# Patient Record
Sex: FEMALE | Race: WHITE | NOT HISPANIC OR LATINO
[De-identification: names, ages, dates, MRNs, and addresses within clinical notes are randomized per-mention and may not be internally consistent; named-entity substitution may affect disease eponyms.]

---

## 2017-01-10 ENCOUNTER — RESULT REVIEW (OUTPATIENT)
Age: 77
End: 2017-01-10

## 2017-01-10 NOTE — ASU PATIENT PROFILE, ADULT - PSH
H/O cystoscopy  h/o TURBT x 2--2015,   H/O hemicolectomy  h/o right hemicolectomy, right salpingo-ooporectomy, partial cystectomy-2014  History of   x 3  S/P CABG (coronary artery bypass graft)

## 2017-01-10 NOTE — ASU PATIENT PROFILE, ADULT - PMH
Bladder cancer  s/o chemoRX-20`13  Colon cancer  2013, s/p chemoRX  COPD (chronic obstructive pulmonary disease)  mild  Dyslipidemia    Herniated lumbar disc without myelopathy    Hypertension  x 25  years  PVD (peripheral vascular disease) with claudication    Smoking history    Vaginal lesion

## 2017-01-11 ENCOUNTER — OUTPATIENT (OUTPATIENT)
Dept: OUTPATIENT SERVICES | Facility: HOSPITAL | Age: 77
LOS: 1 days | Discharge: HOME | End: 2017-01-11
Payer: MEDICARE

## 2017-01-11 ENCOUNTER — APPOINTMENT (OUTPATIENT)
Dept: GYNECOLOGIC ONCOLOGY | Facility: HOSPITAL | Age: 77
End: 2017-01-11

## 2017-01-11 VITALS
SYSTOLIC BLOOD PRESSURE: 130 MMHG | OXYGEN SATURATION: 100 % | DIASTOLIC BLOOD PRESSURE: 70 MMHG | TEMPERATURE: 99 F | RESPIRATION RATE: 17 BRPM | HEART RATE: 56 BPM

## 2017-01-11 VITALS
HEART RATE: 54 BPM | OXYGEN SATURATION: 98 % | RESPIRATION RATE: 16 BRPM | HEIGHT: 57.25 IN | TEMPERATURE: 98 F | WEIGHT: 136.03 LBS | DIASTOLIC BLOOD PRESSURE: 49 MMHG | SYSTOLIC BLOOD PRESSURE: 169 MMHG

## 2017-01-11 DIAGNOSIS — Z95.1 PRESENCE OF AORTOCORONARY BYPASS GRAFT: Chronic | ICD-10-CM

## 2017-01-11 DIAGNOSIS — N89.3 DYSPLASIA OF VAGINA, UNSPECIFIED: ICD-10-CM

## 2017-01-11 LAB
ALBUMIN SERPL ELPH-MCNC: 3.9 G/DL — SIGNIFICANT CHANGE UP (ref 3.3–5)
ALP SERPL-CCNC: 95 U/L — SIGNIFICANT CHANGE UP (ref 40–120)
ALT FLD-CCNC: 14 U/L — SIGNIFICANT CHANGE UP (ref 4–33)
AST SERPL-CCNC: 16 U/L — SIGNIFICANT CHANGE UP (ref 4–32)
BILIRUB DIRECT SERPL-MCNC: 0.1 MG/DL — SIGNIFICANT CHANGE UP (ref 0.1–0.2)
BILIRUB SERPL-MCNC: 0.4 MG/DL — SIGNIFICANT CHANGE UP (ref 0.2–1.2)
HCT VFR BLD CALC: 33.8 % — LOW (ref 34.5–45)
HGB BLD-MCNC: 10.9 G/DL — LOW (ref 11.5–15.5)
HIV1 AG SER QL: SIGNIFICANT CHANGE UP
HIV1+2 AB SPEC QL: SIGNIFICANT CHANGE UP
MCHC RBC-ENTMCNC: 28.9 PG — SIGNIFICANT CHANGE UP (ref 27–34)
MCHC RBC-ENTMCNC: 32.2 % — SIGNIFICANT CHANGE UP (ref 32–36)
MCV RBC AUTO: 89.7 FL — SIGNIFICANT CHANGE UP (ref 80–100)
PLATELET # BLD AUTO: 207 K/UL — SIGNIFICANT CHANGE UP (ref 150–400)
PMV BLD: 10.8 FL — SIGNIFICANT CHANGE UP (ref 7–13)
PROT SERPL-MCNC: 6.1 G/DL — SIGNIFICANT CHANGE UP (ref 6–8.3)
RBC # BLD: 3.77 M/UL — LOW (ref 3.8–5.2)
RBC # FLD: 13.4 % — SIGNIFICANT CHANGE UP (ref 10.3–14.5)
WBC # BLD: 7.73 K/UL — SIGNIFICANT CHANGE UP (ref 3.8–10.5)
WBC # FLD AUTO: 7.73 K/UL — SIGNIFICANT CHANGE UP (ref 3.8–10.5)

## 2017-01-11 PROCEDURE — 88305 TISSUE EXAM BY PATHOLOGIST: CPT | Mod: 26

## 2017-01-11 PROCEDURE — 56620 VULVECTOMY SIMPLE PARTIAL: CPT

## 2017-01-11 RX ORDER — SODIUM CHLORIDE 9 MG/ML
1000 INJECTION, SOLUTION INTRAVENOUS
Qty: 0 | Refills: 0 | Status: DISCONTINUED | OUTPATIENT
Start: 2017-01-11 | End: 2017-01-11

## 2017-01-11 RX ORDER — SODIUM CHLORIDE 9 MG/ML
1000 INJECTION, SOLUTION INTRAVENOUS
Qty: 0 | Refills: 0 | Status: DISCONTINUED | OUTPATIENT
Start: 2017-01-11 | End: 2017-01-12

## 2017-01-11 NOTE — BRIEF OPERATIVE NOTE - OPERATION/FINDINGS
1x1 cm vaginal lesion at 5 o'clock. Friable polypoid tissue, mobile. Ellipsis incision created excising tissue, excellent hemostasis following case.

## 2017-01-11 NOTE — ASU DISCHARGE PLAN (ADULT/PEDIATRIC). - NOTIFY
Unable to Urinate/GYN Fever>100.4/Pain not relieved by Medications/Persistent Nausea and Vomiting/Swelling that continues/Inability to Tolerate Liquids or Foods/Bleeding that does not stop/Excessive Diarrhea/Increased Irritability or Sluggishness

## 2017-01-11 NOTE — ASU DISCHARGE PLAN (ADULT/PEDIATRIC). - MEDICATION SUMMARY - MEDICATIONS TO TAKE
I will START or STAY ON the medications listed below when I get home from the hospital:    Tylenol 325 mg oral capsule  -- 3 tab(s) by mouth every 6 hours  -- Indication: For post op pain    Motrin  mg oral tablet  -- 3 tab(s) by mouth every 6 hours  -- Indication: For post op pain    aspirin 325 mg oral delayed release tablet  -- pt takes occasionally  -- Indication: For home med    losartan 50 mg oral tablet  -- 1 tab(s) by mouth  Mon, Weds Fri  -- Indication: For home med    doxazosin 8 mg oral tablet  -- 2 tab(s) by mouth once a day in pm  -- Indication: For home med    atorvastatin 20 mg oral tablet  -- 1 tab(s) by mouth Mon Weds Fri  -- Indication: For home med    metoprolol tartrate 50 mg oral tablet  -- 1 tab(s) by mouth 2 times a day  -- Indication: For home med    amLODIPine 10 mg oral tablet  -- 1 tab(s) by mouth once a day in am  -- Indication: For home med    furosemide 40 mg oral tablet  -- 1 tab(s) by mouth Mon [place], Weds;1/10/17 ;s/w pt via phone ;  pt states  rx dc d 2 weeks ago ; pt states pcp aware  -- Indication: For home med

## 2017-01-12 LAB
HBV CORE AB SER-ACNC: REACTIVE — SIGNIFICANT CHANGE UP
HBV SURFACE AB SER-ACNC: REACTIVE — SIGNIFICANT CHANGE UP
HBV SURFACE AG SER-ACNC: NONREACTIVE — SIGNIFICANT CHANGE UP
HCV AB S/CO SERPL IA: 0.11 S/CO — SIGNIFICANT CHANGE UP
HCV AB SERPL-IMP: SIGNIFICANT CHANGE UP

## 2017-01-22 LAB — SURGICAL PATHOLOGY STUDY: SIGNIFICANT CHANGE UP

## 2017-01-30 ENCOUNTER — APPOINTMENT (OUTPATIENT)
Dept: GYNECOLOGIC ONCOLOGY | Facility: CLINIC | Age: 77
End: 2017-01-30

## 2017-07-28 ENCOUNTER — APPOINTMENT (OUTPATIENT)
Dept: UROLOGY | Facility: CLINIC | Age: 77
End: 2017-07-28
Payer: MEDICARE

## 2017-07-28 ENCOUNTER — OUTPATIENT (OUTPATIENT)
Dept: OUTPATIENT SERVICES | Facility: HOSPITAL | Age: 77
LOS: 1 days | End: 2017-07-28
Payer: MEDICARE

## 2017-07-28 DIAGNOSIS — Z95.1 PRESENCE OF AORTOCORONARY BYPASS GRAFT: Chronic | ICD-10-CM

## 2017-07-28 DIAGNOSIS — R35.0 FREQUENCY OF MICTURITION: ICD-10-CM

## 2017-07-28 PROCEDURE — 52000 CYSTOURETHROSCOPY: CPT

## 2017-08-01 ENCOUNTER — APPOINTMENT (OUTPATIENT)
Dept: GYNECOLOGIC ONCOLOGY | Facility: CLINIC | Age: 77
End: 2017-08-01

## 2017-08-01 ENCOUNTER — APPOINTMENT (OUTPATIENT)
Dept: GYNECOLOGIC ONCOLOGY | Facility: CLINIC | Age: 77
End: 2017-08-01
Payer: MEDICARE

## 2017-08-01 VITALS
WEIGHT: 135 LBS | SYSTOLIC BLOOD PRESSURE: 122 MMHG | DIASTOLIC BLOOD PRESSURE: 78 MMHG | HEIGHT: 58 IN | BODY MASS INDEX: 28.34 KG/M2

## 2017-08-01 DIAGNOSIS — Z87.411 PERSONAL HISTORY OF VAGINAL DYSPLASIA: ICD-10-CM

## 2017-08-01 LAB — CORE LAB FLUID CYTOLOGY: NORMAL

## 2017-08-01 PROCEDURE — 99213 OFFICE O/P EST LOW 20 MIN: CPT

## 2017-08-07 DIAGNOSIS — C67.9 MALIGNANT NEOPLASM OF BLADDER, UNSPECIFIED: ICD-10-CM

## 2018-01-29 ENCOUNTER — OUTPATIENT (OUTPATIENT)
Dept: OUTPATIENT SERVICES | Facility: HOSPITAL | Age: 78
LOS: 1 days | Discharge: HOME | End: 2018-01-29

## 2018-01-29 VITALS
OXYGEN SATURATION: 95 % | HEIGHT: 58.5 IN | HEART RATE: 62 BPM | WEIGHT: 139.77 LBS | DIASTOLIC BLOOD PRESSURE: 54 MMHG | SYSTOLIC BLOOD PRESSURE: 160 MMHG | RESPIRATION RATE: 16 BRPM | TEMPERATURE: 97 F

## 2018-01-29 DIAGNOSIS — M47.816 SPONDYLOSIS WITHOUT MYELOPATHY OR RADICULOPATHY, LUMBAR REGION: ICD-10-CM

## 2018-01-29 DIAGNOSIS — E78.5 HYPERLIPIDEMIA, UNSPECIFIED: ICD-10-CM

## 2018-01-29 DIAGNOSIS — I10 ESSENTIAL (PRIMARY) HYPERTENSION: ICD-10-CM

## 2018-01-29 DIAGNOSIS — Z01.818 ENCOUNTER FOR OTHER PREPROCEDURAL EXAMINATION: ICD-10-CM

## 2018-01-29 DIAGNOSIS — Z95.1 PRESENCE OF AORTOCORONARY BYPASS GRAFT: Chronic | ICD-10-CM

## 2018-01-29 DIAGNOSIS — M54.9 DORSALGIA, UNSPECIFIED: ICD-10-CM

## 2018-01-29 DIAGNOSIS — Z98.890 OTHER SPECIFIED POSTPROCEDURAL STATES: Chronic | ICD-10-CM

## 2018-01-29 LAB
ALBUMIN SERPL ELPH-MCNC: 4 G/DL — SIGNIFICANT CHANGE UP (ref 3.3–5)
ALP SERPL-CCNC: 121 U/L — HIGH (ref 40–120)
ALT FLD-CCNC: 31 U/L — SIGNIFICANT CHANGE UP (ref 12–78)
ANION GAP SERPL CALC-SCNC: 6 MMOL/L — SIGNIFICANT CHANGE UP (ref 5–17)
APTT BLD: 31.2 SEC — SIGNIFICANT CHANGE UP (ref 27.5–37.4)
AST SERPL-CCNC: 27 U/L — SIGNIFICANT CHANGE UP (ref 15–37)
BASOPHILS # BLD AUTO: 0.03 K/UL — SIGNIFICANT CHANGE UP (ref 0–0.2)
BASOPHILS NFR BLD AUTO: 0.5 % — SIGNIFICANT CHANGE UP (ref 0–2)
BILIRUB SERPL-MCNC: 0.7 MG/DL — SIGNIFICANT CHANGE UP (ref 0.2–1.2)
BUN SERPL-MCNC: 27 MG/DL — HIGH (ref 7–23)
CALCIUM SERPL-MCNC: 9.3 MG/DL — SIGNIFICANT CHANGE UP (ref 8.5–10.1)
CHLORIDE SERPL-SCNC: 106 MMOL/L — SIGNIFICANT CHANGE UP (ref 96–108)
CO2 SERPL-SCNC: 28 MMOL/L — SIGNIFICANT CHANGE UP (ref 22–31)
CREAT SERPL-MCNC: 1.35 MG/DL — HIGH (ref 0.5–1.3)
EOSINOPHIL # BLD AUTO: 0.14 K/UL — SIGNIFICANT CHANGE UP (ref 0–0.5)
EOSINOPHIL NFR BLD AUTO: 2.4 % — SIGNIFICANT CHANGE UP (ref 0–6)
GLUCOSE SERPL-MCNC: 100 MG/DL — HIGH (ref 70–99)
HBA1C BLD-MCNC: 5.4 % — SIGNIFICANT CHANGE UP (ref 4–5.6)
HCT VFR BLD CALC: 42.5 % — SIGNIFICANT CHANGE UP (ref 34.5–45)
HGB BLD-MCNC: 13.8 G/DL — SIGNIFICANT CHANGE UP (ref 11.5–15.5)
IMM GRANULOCYTES NFR BLD AUTO: 0.3 % — SIGNIFICANT CHANGE UP (ref 0–1.5)
INR BLD: 0.98 RATIO — SIGNIFICANT CHANGE UP (ref 0.88–1.16)
LYMPHOCYTES # BLD AUTO: 1.26 K/UL — SIGNIFICANT CHANGE UP (ref 1–3.3)
LYMPHOCYTES # BLD AUTO: 21.7 % — SIGNIFICANT CHANGE UP (ref 13–44)
MCHC RBC-ENTMCNC: 29 PG — SIGNIFICANT CHANGE UP (ref 27–34)
MCHC RBC-ENTMCNC: 32.5 GM/DL — SIGNIFICANT CHANGE UP (ref 32–36)
MCV RBC AUTO: 89.3 FL — SIGNIFICANT CHANGE UP (ref 80–100)
MONOCYTES # BLD AUTO: 0.67 K/UL — SIGNIFICANT CHANGE UP (ref 0–0.9)
MONOCYTES NFR BLD AUTO: 11.5 % — SIGNIFICANT CHANGE UP (ref 2–14)
NEUTROPHILS # BLD AUTO: 3.69 K/UL — SIGNIFICANT CHANGE UP (ref 1.8–7.4)
NEUTROPHILS NFR BLD AUTO: 63.6 % — SIGNIFICANT CHANGE UP (ref 43–77)
PLATELET # BLD AUTO: 221 K/UL — SIGNIFICANT CHANGE UP (ref 150–400)
POTASSIUM SERPL-MCNC: 4.6 MMOL/L — SIGNIFICANT CHANGE UP (ref 3.5–5.3)
POTASSIUM SERPL-SCNC: 4.6 MMOL/L — SIGNIFICANT CHANGE UP (ref 3.5–5.3)
PROT SERPL-MCNC: 7.8 GM/DL — SIGNIFICANT CHANGE UP (ref 6–8.3)
PROTHROM AB SERPL-ACNC: 10.7 SEC — SIGNIFICANT CHANGE UP (ref 9.8–12.7)
RBC # BLD: 4.76 M/UL — SIGNIFICANT CHANGE UP (ref 3.8–5.2)
RBC # FLD: 13.2 % — SIGNIFICANT CHANGE UP (ref 10.3–14.5)
SODIUM SERPL-SCNC: 140 MMOL/L — SIGNIFICANT CHANGE UP (ref 135–145)
WBC # BLD: 5.81 K/UL — SIGNIFICANT CHANGE UP (ref 3.8–10.5)
WBC # FLD AUTO: 5.81 K/UL — SIGNIFICANT CHANGE UP (ref 3.8–10.5)

## 2018-01-29 RX ORDER — IBUPROFEN 200 MG
3 TABLET ORAL
Qty: 0 | Refills: 0 | COMMUNITY

## 2018-01-29 NOTE — H&P PST ADULT - PMH
Bladder cancer  s/o [id number]-20`13  Colon cancer  2013, s/p chemoRX  COPD (chronic obstructive pulmonary disease)  mild  Dyslipidemia    Herniated lumbar disc without myelopathy    Hypertension  x 25  years  PVD (peripheral vascular disease) with claudication    S/P CABG x 2  and AVR  S/P carotid endarterectomy    Smoking history    Vaginal lesion Afib    Bladder cancer  s/o [id number]`13  Colon cancer  2013, s/p chemoRX  COPD (chronic obstructive pulmonary disease)  mild  Dyslipidemia    Herniated lumbar disc without myelopathy    Hypertension  x 25  years  PVD (peripheral vascular disease) with claudication    S/P CABG x 2  and AVR  S/P carotid endarterectomy    Smoking history    Vaginal lesion

## 2018-01-29 NOTE — H&P PST ADULT - ATTENDING COMMENTS
lumbar spinal stenosis with facet arthritis from L3-5 with failure of conservative treatment indicated for laminectomy and fusion from L3-5 - disucssion of r/b/e - patient understands and is well informed

## 2018-01-29 NOTE — H&P PST ADULT - NSANTHOSAYNRD_GEN_A_CORE
denies/No. NORA screening performed.  STOP BANG Legend: 0-2 = LOW Risk; 3-4 = INTERMEDIATE Risk; 5-8 = HIGH Risk

## 2018-01-29 NOTE — H&P PST ADULT - HISTORY OF PRESENT ILLNESS
77 yo female c/o back pains for awhile, not responding to conservative management- scheduled for spinal fusion

## 2018-01-29 NOTE — H&P PST ADULT - PSH
H/O cystoscopy  h/o TURBT x 2--2015,   H/O hemicolectomy  h/o right hemicolectomy, right salpingo-ooporectomy, partial cystectomy-2014  History of   x 3  S/P CABG (coronary artery bypass graft)   and AVR  S/P carotid endarterectomy

## 2018-02-06 ENCOUNTER — TRANSCRIPTION ENCOUNTER (OUTPATIENT)
Age: 78
End: 2018-02-06

## 2018-02-06 ENCOUNTER — INPATIENT (INPATIENT)
Facility: HOSPITAL | Age: 78
LOS: 5 days | Discharge: HOME | End: 2018-02-12
Attending: ORTHOPAEDIC SURGERY | Admitting: ORTHOPAEDIC SURGERY
Payer: MEDICARE

## 2018-02-06 VITALS
HEART RATE: 57 BPM | RESPIRATION RATE: 17 BRPM | DIASTOLIC BLOOD PRESSURE: 52 MMHG | OXYGEN SATURATION: 96 % | HEIGHT: 58.5 IN | WEIGHT: 138.01 LBS | TEMPERATURE: 97 F | SYSTOLIC BLOOD PRESSURE: 174 MMHG

## 2018-02-06 DIAGNOSIS — Z95.1 PRESENCE OF AORTOCORONARY BYPASS GRAFT: Chronic | ICD-10-CM

## 2018-02-06 DIAGNOSIS — Z98.890 OTHER SPECIFIED POSTPROCEDURAL STATES: Chronic | ICD-10-CM

## 2018-02-06 LAB
ANION GAP SERPL CALC-SCNC: 10 MMOL/L — SIGNIFICANT CHANGE UP (ref 5–17)
BASOPHILS # BLD AUTO: 0 K/UL — SIGNIFICANT CHANGE UP (ref 0–0.2)
BASOPHILS NFR BLD AUTO: 0 % — SIGNIFICANT CHANGE UP (ref 0–2)
BLD GP AB SCN SERPL QL: SIGNIFICANT CHANGE UP
BUN SERPL-MCNC: 28 MG/DL — HIGH (ref 7–23)
CALCIUM SERPL-MCNC: 7.6 MG/DL — LOW (ref 8.5–10.1)
CHLORIDE SERPL-SCNC: 113 MMOL/L — HIGH (ref 96–108)
CO2 SERPL-SCNC: 20 MMOL/L — LOW (ref 22–31)
CREAT SERPL-MCNC: 1.35 MG/DL — HIGH (ref 0.5–1.3)
EOSINOPHIL # BLD AUTO: 0.2 K/UL — SIGNIFICANT CHANGE UP (ref 0–0.5)
EOSINOPHIL NFR BLD AUTO: 2 % — SIGNIFICANT CHANGE UP (ref 0–6)
GLUCOSE SERPL-MCNC: 220 MG/DL — HIGH (ref 70–99)
HCT VFR BLD CALC: 30.7 % — LOW (ref 34.5–45)
HGB BLD-MCNC: 10 G/DL — LOW (ref 11.5–15.5)
LYMPHOCYTES # BLD AUTO: 0.51 K/UL — LOW (ref 1–3.3)
LYMPHOCYTES # BLD AUTO: 5 % — LOW (ref 13–44)
MANUAL SMEAR VERIFICATION: SIGNIFICANT CHANGE UP
MCHC RBC-ENTMCNC: 29.9 PG — SIGNIFICANT CHANGE UP (ref 27–34)
MCHC RBC-ENTMCNC: 32.6 GM/DL — SIGNIFICANT CHANGE UP (ref 32–36)
MCV RBC AUTO: 91.9 FL — SIGNIFICANT CHANGE UP (ref 80–100)
MONOCYTES # BLD AUTO: 0.2 K/UL — SIGNIFICANT CHANGE UP (ref 0–0.9)
MONOCYTES NFR BLD AUTO: 2 % — SIGNIFICANT CHANGE UP (ref 2–14)
NEUTROPHILS # BLD AUTO: 9.31 K/UL — HIGH (ref 1.8–7.4)
NEUTROPHILS NFR BLD AUTO: 79 % — HIGH (ref 43–77)
NEUTS BAND # BLD: 12 — SIGNIFICANT CHANGE UP
NRBC # BLD: 0 — SIGNIFICANT CHANGE UP
NRBC # BLD: SIGNIFICANT CHANGE UP /100 WBCS (ref 0–0)
PLAT MORPH BLD: NORMAL — SIGNIFICANT CHANGE UP
PLATELET # BLD AUTO: 163 K/UL — SIGNIFICANT CHANGE UP (ref 150–400)
POTASSIUM SERPL-MCNC: 3.7 MMOL/L — SIGNIFICANT CHANGE UP (ref 3.5–5.3)
POTASSIUM SERPL-SCNC: 3.7 MMOL/L — SIGNIFICANT CHANGE UP (ref 3.5–5.3)
RBC # BLD: 3.34 M/UL — LOW (ref 3.8–5.2)
RBC # FLD: 13.2 % — SIGNIFICANT CHANGE UP (ref 10.3–14.5)
RBC BLD AUTO: SIGNIFICANT CHANGE UP
SODIUM SERPL-SCNC: 143 MMOL/L — SIGNIFICANT CHANGE UP (ref 135–145)
WBC # BLD: 10.23 K/UL — SIGNIFICANT CHANGE UP (ref 3.8–10.5)
WBC # FLD AUTO: 10.23 K/UL — SIGNIFICANT CHANGE UP (ref 3.8–10.5)

## 2018-02-06 PROCEDURE — 99223 1ST HOSP IP/OBS HIGH 75: CPT

## 2018-02-06 RX ORDER — FENTANYL CITRATE 50 UG/ML
25 INJECTION INTRAVENOUS
Qty: 0 | Refills: 0 | Status: DISCONTINUED | OUTPATIENT
Start: 2018-02-06 | End: 2018-02-06

## 2018-02-06 RX ORDER — DOCUSATE SODIUM 100 MG
100 CAPSULE ORAL THREE TIMES A DAY
Qty: 0 | Refills: 0 | Status: DISCONTINUED | OUTPATIENT
Start: 2018-02-06 | End: 2018-02-12

## 2018-02-06 RX ORDER — SODIUM CHLORIDE 9 MG/ML
1000 INJECTION, SOLUTION INTRAVENOUS
Qty: 0 | Refills: 0 | Status: DISCONTINUED | OUTPATIENT
Start: 2018-02-06 | End: 2018-02-06

## 2018-02-06 RX ORDER — DIPHENHYDRAMINE HCL 50 MG
12.5 CAPSULE ORAL EVERY 4 HOURS
Qty: 0 | Refills: 0 | Status: DISCONTINUED | OUTPATIENT
Start: 2018-02-06 | End: 2018-02-11

## 2018-02-06 RX ORDER — MORPHINE SULFATE 50 MG/1
2 CAPSULE, EXTENDED RELEASE ORAL EVERY 4 HOURS
Qty: 0 | Refills: 0 | Status: DISCONTINUED | OUTPATIENT
Start: 2018-02-06 | End: 2018-02-06

## 2018-02-06 RX ORDER — METOCLOPRAMIDE HCL 10 MG
10 TABLET ORAL ONCE
Qty: 0 | Refills: 0 | Status: COMPLETED | OUTPATIENT
Start: 2018-02-06 | End: 2018-02-07

## 2018-02-06 RX ORDER — CEFAZOLIN SODIUM 1 G
2000 VIAL (EA) INJECTION EVERY 8 HOURS
Qty: 0 | Refills: 0 | Status: COMPLETED | OUTPATIENT
Start: 2018-02-06 | End: 2018-02-06

## 2018-02-06 RX ORDER — AMLODIPINE BESYLATE 2.5 MG/1
10 TABLET ORAL DAILY
Qty: 0 | Refills: 0 | Status: DISCONTINUED | OUTPATIENT
Start: 2018-02-06 | End: 2018-02-12

## 2018-02-06 RX ORDER — ACETAMINOPHEN 500 MG
3 TABLET ORAL
Qty: 0 | Refills: 0 | COMMUNITY

## 2018-02-06 RX ORDER — OXYCODONE AND ACETAMINOPHEN 5; 325 MG/1; MG/1
1 TABLET ORAL EVERY 4 HOURS
Qty: 0 | Refills: 0 | Status: DISCONTINUED | OUTPATIENT
Start: 2018-02-06 | End: 2018-02-12

## 2018-02-06 RX ORDER — METOPROLOL TARTRATE 50 MG
50 TABLET ORAL
Qty: 0 | Refills: 0 | Status: DISCONTINUED | OUTPATIENT
Start: 2018-02-06 | End: 2018-02-12

## 2018-02-06 RX ORDER — ATORVASTATIN CALCIUM 80 MG/1
20 TABLET, FILM COATED ORAL AT BEDTIME
Qty: 0 | Refills: 0 | Status: DISCONTINUED | OUTPATIENT
Start: 2018-02-06 | End: 2018-02-06

## 2018-02-06 RX ORDER — ATORVASTATIN CALCIUM 80 MG/1
20 TABLET, FILM COATED ORAL
Qty: 0 | Refills: 0 | Status: DISCONTINUED | OUTPATIENT
Start: 2018-02-06 | End: 2018-02-12

## 2018-02-06 RX ORDER — DOXAZOSIN MESYLATE 4 MG
8 TABLET ORAL AT BEDTIME
Qty: 0 | Refills: 0 | Status: DISCONTINUED | OUTPATIENT
Start: 2018-02-06 | End: 2018-02-12

## 2018-02-06 RX ORDER — SODIUM CHLORIDE 9 MG/ML
1000 INJECTION, SOLUTION INTRAVENOUS
Qty: 0 | Refills: 0 | Status: DISCONTINUED | OUTPATIENT
Start: 2018-02-06 | End: 2018-02-07

## 2018-02-06 RX ORDER — DOXAZOSIN MESYLATE 4 MG
16 TABLET ORAL AT BEDTIME
Qty: 0 | Refills: 0 | Status: DISCONTINUED | OUTPATIENT
Start: 2018-02-06 | End: 2018-02-06

## 2018-02-06 RX ORDER — FENTANYL CITRATE 50 UG/ML
50 INJECTION INTRAVENOUS
Qty: 0 | Refills: 0 | Status: DISCONTINUED | OUTPATIENT
Start: 2018-02-06 | End: 2018-02-06

## 2018-02-06 RX ORDER — OXYCODONE AND ACETAMINOPHEN 5; 325 MG/1; MG/1
2 TABLET ORAL EVERY 6 HOURS
Qty: 0 | Refills: 0 | Status: DISCONTINUED | OUTPATIENT
Start: 2018-02-06 | End: 2018-02-10

## 2018-02-06 RX ORDER — ACETAMINOPHEN 500 MG
1000 TABLET ORAL ONCE
Qty: 0 | Refills: 0 | Status: COMPLETED | OUTPATIENT
Start: 2018-02-06 | End: 2018-02-06

## 2018-02-06 RX ORDER — LOSARTAN POTASSIUM 100 MG/1
1 TABLET, FILM COATED ORAL
Qty: 0 | Refills: 0 | COMMUNITY

## 2018-02-06 RX ORDER — LOSARTAN POTASSIUM 100 MG/1
100 TABLET, FILM COATED ORAL DAILY
Qty: 0 | Refills: 0 | Status: DISCONTINUED | OUTPATIENT
Start: 2018-02-06 | End: 2018-02-12

## 2018-02-06 RX ADMIN — FENTANYL CITRATE 50 MICROGRAM(S): 50 INJECTION INTRAVENOUS at 14:18

## 2018-02-06 RX ADMIN — Medication 8 MILLIGRAM(S): at 23:08

## 2018-02-06 RX ADMIN — OXYCODONE AND ACETAMINOPHEN 2 TABLET(S): 5; 325 TABLET ORAL at 20:23

## 2018-02-06 RX ADMIN — OXYCODONE AND ACETAMINOPHEN 2 TABLET(S): 5; 325 TABLET ORAL at 19:43

## 2018-02-06 RX ADMIN — Medication 1000 MILLIGRAM(S): at 14:13

## 2018-02-06 RX ADMIN — FENTANYL CITRATE 50 MICROGRAM(S): 50 INJECTION INTRAVENOUS at 14:03

## 2018-02-06 RX ADMIN — Medication 400 MILLIGRAM(S): at 13:57

## 2018-02-06 RX ADMIN — SODIUM CHLORIDE 100 MILLILITER(S): 9 INJECTION, SOLUTION INTRAVENOUS at 13:58

## 2018-02-06 RX ADMIN — Medication 100 MILLIGRAM(S): at 16:19

## 2018-02-06 RX ADMIN — Medication 100 MILLIGRAM(S): at 23:25

## 2018-02-06 NOTE — ASU PATIENT PROFILE, ADULT - PMH
Afib    Bladder cancer  s/o chemoRX-20`13  Colon cancer  2013, s/p chemoRX  COPD (chronic obstructive pulmonary disease)  mild  Dyslipidemia    Herniated lumbar disc without myelopathy    Hypertension  x 25  years  PVD (peripheral vascular disease) with claudication    S/P CABG x 2  and AVR  S/P carotid endarterectomy    Smoking history    Vaginal lesion

## 2018-02-06 NOTE — CONSULT NOTE ADULT - SUBJECTIVE AND OBJECTIVE BOX
LABS:                  RADIOLOGY & ADDITIONAL TESTS:          PHYSICAL EXAMINATION:  Vital Signs Last 24 Hrs  T(C): 36 (06 Feb 2018 13:00), Max: 36.1 (06 Feb 2018 06:42)  T(F): 96.8 (06 Feb 2018 13:00), Max: 97 (06 Feb 2018 06:42)  HR: 62 (06 Feb 2018 13:30) (57 - 71)  BP: 108/59 (06 Feb 2018 13:30) (98/51 - 174/52)  BP(mean): --  RR: 20 (06 Feb 2018 13:30) (17 - 20)  SpO2: 100% (06 Feb 2018 13:30) (96% - 100%)  CAPILLARY BLOOD GLUCOSE          GENERAL: NAD, well-groomed, well-developed  HEAD:  atraumatic, normocephalic  EYES: sclera anicteric  ENMT: mucous membranes moist  NECK: supple, No JVD  CHEST/LUNG: clear to auscultation bilaterally; no rales, rhonchi, or wheezing b/l  HEART: normal S1, S2  ABDOMEN: BS+, soft, ND, NT   EXTREMITIES:  pulses palpable; no clubbing, cyanosis, or edema b/l LEs  NEURO: awake, alert, interactive; moves all extremities  SKIN: no rashes or lesions 78 year old female PMH of HTN, HLD, admitted for elective lumbar spinal laminectomy and spinal fusion. Denies HA, CP,SOB, abdominal pain, leg edema or syncope. Remaining ROS is WNL.     Allergies:     Social:             LABS:                  RADIOLOGY & ADDITIONAL TESTS:          PHYSICAL EXAMINATION:  Vital Signs Last 24 Hrs  T(C): 36 (06 Feb 2018 13:00), Max: 36.1 (06 Feb 2018 06:42)  T(F): 96.8 (06 Feb 2018 13:00), Max: 97 (06 Feb 2018 06:42)  HR: 62 (06 Feb 2018 13:30) (57 - 71)  BP: 108/59 (06 Feb 2018 13:30) (98/51 - 174/52)  BP(mean): --  RR: 20 (06 Feb 2018 13:30) (17 - 20)  SpO2: 100% (06 Feb 2018 13:30) (96% - 100%)  CAPILLARY BLOOD GLUCOSE          GENERAL: NAD, well-groomed, well-developed  HEAD:  atraumatic, normocephalic  EYES: sclera anicteric  ENMT: mucous membranes moist  NECK: supple, No JVD  CHEST/LUNG: clear to auscultation bilaterally; no rales, rhonchi, or wheezing b/l  HEART: normal S1, S2  ABDOMEN: BS+, soft, ND, NT   EXTREMITIES:  pulses palpable; no clubbing, cyanosis, or edema b/l LEs  NEURO: awake, alert, interactive; moves all extremities  SKIN: no rashes or lesions 78 year old female PMH of HTN, HLD, A.fib. PVD, admitted for elective lumbar spinal laminectomy and spinal fusion. Denies HA, CP,SOB, abdominal pain, leg edema or syncope. Remaining ROS is not fully assessable as seen in PACU post OR.                    LABS:                  RADIOLOGY & ADDITIONAL TESTS:                                PHYSICAL EXAMINATION:  Vital Signs Last 24 Hrs  T(C): 36 (06 Feb 2018 13:00), Max: 36.1 (06 Feb 2018 06:42)  T(F): 96.8 (06 Feb 2018 13:00), Max: 97 (06 Feb 2018 06:42)  HR: 62 (06 Feb 2018 13:30) (57 - 71)  BP: 108/59 (06 Feb 2018 13:30) (98/51 - 174/52)  BP(mean): --  RR: 20 (06 Feb 2018 13:30) (17 - 20)  SpO2: 100% (06 Feb 2018 13:30) (96% - 100%)  CAPILLARY BLOOD GLUCOSE          GENERAL: NAD, asleep in PACU.   HEAD:  atraumatic, normocephalic  EYES: sclera anicteric  ENMT: mucous membranes moist  NECK: supple, No JVD  CHEST/LUNG: clear to auscultation bilaterally; no rales, rhonchi, or wheezing b/l  HEART: normal S1, S2  ABDOMEN: BS+, soft, ND, NT   EXTREMITIES:  pulses palpable; no clubbing, cyanosis, or edema b/l LEs  NEURO: awake, alert, interactive; moves all extremities  SKIN: no rashes or lesions 78 year old female PMH of HTN, HLD, A.fib. PVD, admitted for elective lumbar spinal laminectomy and spinal fusion. Denies HA, CP,SOB, abdominal pain, leg edema or syncope. Remaining ROS is not fully assessable as seen in PACU post OR.        PSH: Cartoid endarterectomy, CABG x 2.            LABS:                  RADIOLOGY & ADDITIONAL TESTS:                                PHYSICAL EXAMINATION:  Vital Signs Last 24 Hrs  T(C): 36 (06 Feb 2018 13:00), Max: 36.1 (06 Feb 2018 06:42)  T(F): 96.8 (06 Feb 2018 13:00), Max: 97 (06 Feb 2018 06:42)  HR: 62 (06 Feb 2018 13:30) (57 - 71)  BP: 108/59 (06 Feb 2018 13:30) (98/51 - 174/52)  BP(mean): --  RR: 20 (06 Feb 2018 13:30) (17 - 20)  SpO2: 100% (06 Feb 2018 13:30) (96% - 100%)  CAPILLARY BLOOD GLUCOSE          GENERAL: NAD, asleep in PACU.   HEAD:  atraumatic, normocephalic  EYES: sclera anicteric  ENMT: mucous membranes moist  NECK: supple, No JVD  CHEST/LUNG: clear to auscultation bilaterally; no rales, rhonchi, or wheezing b/l  HEART: normal S1, S2  ABDOMEN: BS+, soft, ND, NT   EXTREMITIES:  pulses palpable; no clubbing, cyanosis, or edema b/l LEs  NEURO: awake, alert, interactive; moves all extremities  SKIN: no rashes or lesions 78 year old female PMH of HTN, HLD, A.fib. PVD, admitted for elective lumbar spinal laminectomy and spinal fusion. Denies HA, CP,SOB, abdominal pain, leg edema or syncope. Remaining ROS is not fully assessable as seen in PACU post OR.        PSH: Cartoid endarterectomy, CABG x 2.        LABS:                        11.7   11.98 )-----------( 143      ( 07 Feb 2018 06:38 )             34.1     02-07    140  |  110<H>  |  24<H>  ----------------------------<  104<H>  5.1   |  23  |  1.19    Ca    7.6<L>      07 Feb 2018 06:38        PhySICAL EXAMINATION:  Vital Signs Last 24 Hrs  T(C): 36 (06 Feb 2018 13:00), Max: 36.1 (06 Feb 2018 06:42)  T(F): 96.8 (06 Feb 2018 13:00), Max: 97 (06 Feb 2018 06:42)  HR: 62 (06 Feb 2018 13:30) (57 - 71)  BP: 108/59 (06 Feb 2018 13:30) (98/51 - 174/52)  BP(mean): --  RR: 20 (06 Feb 2018 13:30) (17 - 20)  SpO2: 100% (06 Feb 2018 13:30) (96% - 100%)  CAPILLARY BLOOD GLUCOSE          GENERAL: NAD, asleep in PACU.   HEAD:  atraumatic, normocephalic  EYES: sclera anicteric  ENMT: mucous membranes moist  NECK: supple, No JVD  CHEST/LUNG: clear to auscultation bilaterally; no rales, rhonchi, or wheezing b/l  HEART: normal S1, S2  ABDOMEN: BS+, soft, ND, NT   EXTREMITIES:  pulses palpable; no clubbing, cyanosis, or edema b/l LEs  NEURO: awake, alert, interactive; moves all extremities  SKIN: no rashes or lesions 78 year old female PMH of HTN, HLD, ASHD, admitted for elective lumbar spinal laminectomy and spinal fusion. Denies HA, CP,SOB, abdominal pain, leg edema or syncope. Remaining ROS is not fully assessable as seen in PACU post OR.  Had pre-op chemical stress test 2 months prior to operation and was reported to patient as normal.       PSH: Left cartoid endarterectomy, CABG.      NKDA    No smoking or daily ETOH use.         LABS:                        11.7   11.98 )-----------( 143      ( 07 Feb 2018 06:38 )             34.1     02-07    140  |  110<H>  |  24<H>  ----------------------------<  104<H>  5.1   |  23  |  1.19    Ca    7.6<L>      07 Feb 2018 06:38        PhySICAL EXAMINATION:  Vital Signs Last 24 Hrs  T(C): 36 (06 Feb 2018 13:00), Max: 36.1 (06 Feb 2018 06:42)  T(F): 96.8 (06 Feb 2018 13:00), Max: 97 (06 Feb 2018 06:42)  HR: 62 (06 Feb 2018 13:30) (57 - 71)  BP: 108/59 (06 Feb 2018 13:30) (98/51 - 174/52)  BP(mean): --  RR: 20 (06 Feb 2018 13:30) (17 - 20)  SpO2: 100% (06 Feb 2018 13:30) (96% - 100%)  CAPILLARY BLOOD GLUCOSE          GENERAL: NAD, asleep in PACU.   HEAD:  atraumatic, normocephalic  EYES: sclera anicteric  ENMT: mucous membranes moist  NECK: supple, No JVD  CHEST/LUNG: clear to auscultation bilaterally; no rales, rhonchi, or wheezing b/l  HEART: normal S1, S2  ABDOMEN: BS+, soft, ND, NT   EXTREMITIES:  pulses palpable; no clubbing, cyanosis, or edema b/l LEs  NEURO: awake, alert, interactive; moves all extremities  SKIN: no rashes or lesions

## 2018-02-06 NOTE — PHARMACY COMMUNICATION NOTE - COMMENTS
verified cardura 16mg dose with PRASAD Hawkins and NP clinton ferguson; cardura 16mg is pt's home dose and continue med with parameters hold sbp<110

## 2018-02-06 NOTE — PROGRESS NOTE ADULT - SUBJECTIVE AND OBJECTIVE BOX
Post-op Check   POD#0 s/p Lami/Fusion L3-L5  Denies SOB, CP, N/V/D     PE Back/LE: Dressing C/D/I, JR intact, Sensation/motor intact, DP 2+, FROM ankle/toes     A: As above   P: Pain Control, DVT prophylaxis SCD's, Incentive spirometry, PT WBAT, Monitor JR output, Ortho to F/U

## 2018-02-06 NOTE — CONSULT NOTE ADULT - ASSESSMENT
78 year old female PMH of HTN, HLD, admitted for elective lumbar spinal laminectomy and spinal fusion. Denies HA, CP,SOB, abdominal pain, leg edema or syncope. Remaining ROS is WNL.     CV: Continue Norvasc 10 mg/day, Cozaar 100 mg/day, Lopressor 50 mg BID and Cardura 8 mg/day. Taper IVF to off as soon as tolerating PO diet.     Continue PT eval and routine post-op care. DVT prophylaxis with PAS. 78 year old female PMH of HTN, HLD, admitted for elective lumbar spinal laminectomy and spinal fusion. Denies HA, CP,SOB, abdominal pain, leg edema or syncope. Remaining ROS is WNL.       CV: Continue Norvasc 10 mg/day, Cozaar 100 mg/day, Lopressor 50 mg BID and Cardura 16mg/day. Hold resuming any AC for A.fib today as POD number 1. Taper IVF to off as soon as tolerating PO diet.     Continue PT eval and routine post-op care. DVT prophylaxis with PAS.     Will follow in AM.

## 2018-02-07 LAB
ANION GAP SERPL CALC-SCNC: 7 MMOL/L — SIGNIFICANT CHANGE UP (ref 5–17)
BASOPHILS # BLD AUTO: 0.01 K/UL — SIGNIFICANT CHANGE UP (ref 0–0.2)
BASOPHILS NFR BLD AUTO: 0.1 % — SIGNIFICANT CHANGE UP (ref 0–2)
BUN SERPL-MCNC: 24 MG/DL — HIGH (ref 7–23)
CALCIUM SERPL-MCNC: 7.6 MG/DL — LOW (ref 8.5–10.1)
CHLORIDE SERPL-SCNC: 110 MMOL/L — HIGH (ref 96–108)
CO2 SERPL-SCNC: 23 MMOL/L — SIGNIFICANT CHANGE UP (ref 22–31)
CREAT SERPL-MCNC: 1.19 MG/DL — SIGNIFICANT CHANGE UP (ref 0.5–1.3)
EOSINOPHIL # BLD AUTO: 0 K/UL — SIGNIFICANT CHANGE UP (ref 0–0.5)
EOSINOPHIL NFR BLD AUTO: 0 % — SIGNIFICANT CHANGE UP (ref 0–6)
GLUCOSE SERPL-MCNC: 104 MG/DL — HIGH (ref 70–99)
HCT VFR BLD CALC: 33.6 % — LOW (ref 34.5–45)
HCT VFR BLD CALC: 34.1 % — LOW (ref 34.5–45)
HGB BLD-MCNC: 11.6 G/DL — SIGNIFICANT CHANGE UP (ref 11.5–15.5)
HGB BLD-MCNC: 11.7 G/DL — SIGNIFICANT CHANGE UP (ref 11.5–15.5)
IMM GRANULOCYTES NFR BLD AUTO: 0.5 % — SIGNIFICANT CHANGE UP (ref 0–1.5)
LYMPHOCYTES # BLD AUTO: 0.42 K/UL — LOW (ref 1–3.3)
LYMPHOCYTES # BLD AUTO: 3.5 % — LOW (ref 13–44)
MANUAL SMEAR VERIFICATION: SIGNIFICANT CHANGE UP
MCHC RBC-ENTMCNC: 30.1 PG — SIGNIFICANT CHANGE UP (ref 27–34)
MCHC RBC-ENTMCNC: 30.4 PG — SIGNIFICANT CHANGE UP (ref 27–34)
MCHC RBC-ENTMCNC: 34.3 GM/DL — SIGNIFICANT CHANGE UP (ref 32–36)
MCHC RBC-ENTMCNC: 34.5 GM/DL — SIGNIFICANT CHANGE UP (ref 32–36)
MCV RBC AUTO: 87.7 FL — SIGNIFICANT CHANGE UP (ref 80–100)
MCV RBC AUTO: 88 FL — SIGNIFICANT CHANGE UP (ref 80–100)
MONOCYTES # BLD AUTO: 0.85 K/UL — SIGNIFICANT CHANGE UP (ref 0–0.9)
MONOCYTES NFR BLD AUTO: 7.1 % — SIGNIFICANT CHANGE UP (ref 2–14)
NEUTROPHILS # BLD AUTO: 10.64 K/UL — HIGH (ref 1.8–7.4)
NEUTROPHILS NFR BLD AUTO: 88.8 % — HIGH (ref 43–77)
NRBC # BLD: 0 /100 WBCS — SIGNIFICANT CHANGE UP (ref 0–0)
NRBC # BLD: 0 /100 WBCS — SIGNIFICANT CHANGE UP (ref 0–0)
PLAT MORPH BLD: SIGNIFICANT CHANGE UP
PLATELET # BLD AUTO: 137 K/UL — LOW (ref 150–400)
PLATELET # BLD AUTO: 143 K/UL — LOW (ref 150–400)
POTASSIUM SERPL-MCNC: 5.1 MMOL/L — SIGNIFICANT CHANGE UP (ref 3.5–5.3)
POTASSIUM SERPL-SCNC: 5.1 MMOL/L — SIGNIFICANT CHANGE UP (ref 3.5–5.3)
RBC # BLD: 3.82 M/UL — SIGNIFICANT CHANGE UP (ref 3.8–5.2)
RBC # BLD: 3.89 M/UL — SIGNIFICANT CHANGE UP (ref 3.8–5.2)
RBC # FLD: 14.6 % — HIGH (ref 10.3–14.5)
RBC # FLD: 15.2 % — HIGH (ref 10.3–14.5)
RBC BLD AUTO: SIGNIFICANT CHANGE UP
SODIUM SERPL-SCNC: 140 MMOL/L — SIGNIFICANT CHANGE UP (ref 135–145)
WBC # BLD: 11.63 K/UL — HIGH (ref 3.8–10.5)
WBC # BLD: 11.98 K/UL — HIGH (ref 3.8–10.5)
WBC # FLD AUTO: 11.63 K/UL — HIGH (ref 3.8–10.5)
WBC # FLD AUTO: 11.98 K/UL — HIGH (ref 3.8–10.5)

## 2018-02-07 PROCEDURE — 99233 SBSQ HOSP IP/OBS HIGH 50: CPT

## 2018-02-07 RX ORDER — SENNA PLUS 8.6 MG/1
2 TABLET ORAL AT BEDTIME
Qty: 0 | Refills: 0 | Status: DISCONTINUED | OUTPATIENT
Start: 2018-02-07 | End: 2018-02-12

## 2018-02-07 RX ORDER — CEFAZOLIN SODIUM 1 G
VIAL (EA) INJECTION
Qty: 0 | Refills: 0 | Status: DISCONTINUED | OUTPATIENT
Start: 2018-02-07 | End: 2018-02-10

## 2018-02-07 RX ORDER — CEFAZOLIN SODIUM 1 G
2000 VIAL (EA) INJECTION EVERY 8 HOURS
Qty: 0 | Refills: 0 | Status: DISCONTINUED | OUTPATIENT
Start: 2018-02-07 | End: 2018-02-10

## 2018-02-07 RX ORDER — CEFAZOLIN SODIUM 1 G
2000 VIAL (EA) INJECTION ONCE
Qty: 0 | Refills: 0 | Status: COMPLETED | OUTPATIENT
Start: 2018-02-07 | End: 2018-02-07

## 2018-02-07 RX ADMIN — OXYCODONE AND ACETAMINOPHEN 1 TABLET(S): 5; 325 TABLET ORAL at 19:49

## 2018-02-07 RX ADMIN — OXYCODONE AND ACETAMINOPHEN 2 TABLET(S): 5; 325 TABLET ORAL at 03:33

## 2018-02-07 RX ADMIN — SODIUM CHLORIDE 100 MILLILITER(S): 9 INJECTION, SOLUTION INTRAVENOUS at 11:00

## 2018-02-07 RX ADMIN — OXYCODONE AND ACETAMINOPHEN 1 TABLET(S): 5; 325 TABLET ORAL at 01:30

## 2018-02-07 RX ADMIN — Medication 50 MILLIGRAM(S): at 17:08

## 2018-02-07 RX ADMIN — OXYCODONE AND ACETAMINOPHEN 2 TABLET(S): 5; 325 TABLET ORAL at 23:05

## 2018-02-07 RX ADMIN — Medication 10 MILLIGRAM(S): at 03:27

## 2018-02-07 RX ADMIN — OXYCODONE AND ACETAMINOPHEN 2 TABLET(S): 5; 325 TABLET ORAL at 08:51

## 2018-02-07 RX ADMIN — Medication 8 MILLIGRAM(S): at 22:06

## 2018-02-07 RX ADMIN — OXYCODONE AND ACETAMINOPHEN 1 TABLET(S): 5; 325 TABLET ORAL at 00:47

## 2018-02-07 RX ADMIN — SODIUM CHLORIDE 100 MILLILITER(S): 9 INJECTION, SOLUTION INTRAVENOUS at 04:12

## 2018-02-07 RX ADMIN — OXYCODONE AND ACETAMINOPHEN 2 TABLET(S): 5; 325 TABLET ORAL at 22:05

## 2018-02-07 RX ADMIN — ATORVASTATIN CALCIUM 20 MILLIGRAM(S): 80 TABLET, FILM COATED ORAL at 22:06

## 2018-02-07 RX ADMIN — Medication 100 MILLIGRAM(S): at 17:40

## 2018-02-07 RX ADMIN — OXYCODONE AND ACETAMINOPHEN 2 TABLET(S): 5; 325 TABLET ORAL at 04:30

## 2018-02-07 RX ADMIN — OXYCODONE AND ACETAMINOPHEN 2 TABLET(S): 5; 325 TABLET ORAL at 09:45

## 2018-02-07 RX ADMIN — OXYCODONE AND ACETAMINOPHEN 1 TABLET(S): 5; 325 TABLET ORAL at 18:49

## 2018-02-07 NOTE — PROGRESS NOTE ADULT - ASSESSMENT
78 year old female PMH of HTN, HLD, admitted for elective lumbar spinal laminectomy and spinal fusion. Denies HA, CP,SOB, abdominal pain, leg edema or syncope. Remaining ROS is WNL. Post-op labs all WNL.       CV: Continue Norvasc 10 mg/day, Cozaar 100 mg/day, Lopressor 50 mg BID and Cardura 8 mg/day. Hold resuming any AC for A.fib today as POD number 1. Taper IVF to off as soon as tolerating PO diet.     Continue PT eval and routine post-op care. DVT prophylaxis with PAS.     Will follow in AM. 78 year old female PMH of HTN, HLD, admitted for elective lumbar spinal laminectomy and spinal fusion. Denies HA, CP,SOB, abdominal pain, leg edema or syncope. Remaining ROS is WNL. Post-op labs all WNL.       CV: Continue Norvasc 10 mg/day, Cozaar 100 mg/day, Lopressor 50 mg BID and Cardura 8 mg/day. Stopped IVF. Resume regular diet.      Continue PT eval and routine post-op care. DVT prophylaxis with PAS. TOV in AM. Will sign off.

## 2018-02-07 NOTE — PHYSICAL THERAPY INITIAL EVALUATION ADULT - GENERAL OBSERVATIONS, REHAB EVAL
Pt was seen in supine c lumbar  dressing and Jatinder Yan drainage intact, Whitman and O2 at 2.5 l/min  through nasal cannula donned, alert and Ox4. Pt was instructed spinal precaution prior to P.T. intervention and pt had verbal understanding and return demonstration.

## 2018-02-07 NOTE — PROGRESS NOTE ADULT - SUBJECTIVE AND OBJECTIVE BOX
Postoperative Day #1 s/p  POSTERIOR SPINAL FUSION L3 L5 LAMINECTOMY L3 L5 WITH IMAGE  POSTERIOR SPINAL FUSION L3 L5      Patient seen and examined bedside resting comfortably.  C/O burning in both anterior thighs after having physical therapy.  Denies nausea and vomiting. Tolerating diet.  Flatus/BM. +  Denies chest pain, dyspnea, cough.  T(F): 96.9 (02-07-18 @ 11:29), Max: 97.3 (02-07-18 @ 04:00)  HR: 69 (02-07-18 @ 13:45) (55 - 78)  BP: 146/68 (02-07-18 @ 13:45) (94/40 - 148/81)  RR: 16 (02-07-18 @ 13:45) (15 - 18)  SpO2: 96% (02-07-18 @ 13:45) (92% - 100%)    PHYSICAL EXAM:  General: NAD, WDWN  Neuro:  Alert & oriented x 3  Abdomen: soft, NTND. Normactive BS  Whitman cath in place.  Dressing on back dry & intact. JR drain in place. Drained 300 ml over the last shift; 350 ml total since OR. Has drained an additional 90 ml this shift.  NVS of both LE good.  LABS:                        11.7   11.98 )-----------( 143      ( 07 Feb 2018 06:38 )             34.1     02-07    140  |  110<H>  |  24<H>  ----------------------------<  104<H>  5.1   |  23  |  1.19    Ca    7.6<L>      07 Feb 2018 06:38        I&O's Detail    06 Feb 2018 07:01  -  07 Feb 2018 07:00  --------------------------------------------------------  IN:    lactated ringers.: 525 mL    lactated ringers.: 250 mL    Oral Fluid: 650 mL    PRBCs (Packed Red Blood Cells): 687 mL    Solution: 200 mL  Total IN: 2312 mL    OUT:    Bulb: 530 mL    Indwelling Catheter - Urethral: 850 mL  Total OUT: 1380 mL    Total NET: 932 mL      07 Feb 2018 07:01  -  07 Feb 2018 14:37  --------------------------------------------------------  IN:  Total IN: 0 mL    OUT:    Bulb: 90 mL  Total OUT: 90 mL    Total NET: -90 mL        Impression: 78y Female Postoperative Day #1 s/p  POSTERIOR SPINAL FUSION L3 L5 LAMINECTOMY L3 L5 WITH IMAGE  POSTERIOR SPINAL FUSION L3 L5    Plan:  -continue VTE prophylaxis   - prophylactic Ancef was completed. Will ask Dr. Leal if he wants to continue since JR is still in.  -Increase activity with PT, OOB, Ambulate  -Patient instructed on and encouraged incentive spirometry use  will discuss with  about continuing J Carlos.  -continue local wound care  -f/u AM labs  -will discuss with surgical attending Postoperative Day #1 s/p  POSTERIOR SPINAL FUSION L3 L5 LAMINECTOMY L3 L5 WITH IMAGE  POSTERIOR SPINAL FUSION L3 L5      Patient seen and examined bedside resting comfortably.  C/O burning in both anterior thighs after having physical therapy.  Denies nausea and vomiting. Tolerating diet.  Flatus/BM. +  Denies chest pain, dyspnea, cough.  T(F): 96.9 (02-07-18 @ 11:29), Max: 97.3 (02-07-18 @ 04:00)  HR: 69 (02-07-18 @ 13:45) (55 - 78)  BP: 146/68 (02-07-18 @ 13:45) (94/40 - 148/81)  RR: 16 (02-07-18 @ 13:45) (15 - 18)  SpO2: 96% (02-07-18 @ 13:45) (92% - 100%)    PHYSICAL EXAM:  General: NAD, WDWN  Neuro:  Alert & oriented x 3  Abdomen: soft, NTND. Normactive BS  Whitman cath in place.  Dressing on back dry & intact. JR drain in place. Drained 300 ml over the last shift; 350 ml total since OR. Has drained an additional 90 ml this shift.  NVS of both LE good.  LABS:                        11.7   11.98 )-----------( 143      ( 07 Feb 2018 06:38 )             34.1     02-07    140  |  110<H>  |  24<H>  ----------------------------<  104<H>  5.1   |  23  |  1.19    Ca    7.6<L>      07 Feb 2018 06:38        I&O's Detail    06 Feb 2018 07:01  -  07 Feb 2018 07:00  --------------------------------------------------------  IN:    lactated ringers.: 525 mL    lactated ringers.: 250 mL    Oral Fluid: 650 mL    PRBCs (Packed Red Blood Cells): 687 mL    Solution: 200 mL  Total IN: 2312 mL    OUT:    Bulb: 530 mL    Indwelling Catheter - Urethral: 850 mL  Total OUT: 1380 mL    Total NET: 932 mL      07 Feb 2018 07:01  -  07 Feb 2018 14:37  --------------------------------------------------------  IN:  Total IN: 0 mL    OUT:    Bulb: 90 mL  Total OUT: 90 mL    Total NET: -90 mL        Impression: 78y Female Postoperative Day #1 s/p  POSTERIOR SPINAL FUSION L3 L5 LAMINECTOMY L3 L5 WITH IMAGE  POSTERIOR SPINAL FUSION L3 L5  decrease in pO2 when pt took off mask fro ambulating with physical therapy.    Plan:  -continue VTE prophylaxis   - prophylactic Ancef was completed. Will ask Dr. Leal if he wants to continue since JR is still in.  -Increase activity with PT, OOB, Ambulate  -Patient instructed on and encouraged incentive spirometry use  will discuss with  about continuing J Carlos.  -continue supplemental O2.  -continue local wound care  -f/u AM labs  -will discuss with surgical attending

## 2018-02-07 NOTE — PHYSICAL THERAPY INITIAL EVALUATION ADULT - GAIT DEVIATIONS NOTED, PT EVAL
decreased step length/decreased stride length/decreased weight-shifting ability/decreased daksha/decreased velocity of limb motion

## 2018-02-07 NOTE — PHYSICAL THERAPY INITIAL EVALUATION ADULT - PLANNED THERAPY INTERVENTIONS, PT EVAL
balance training/gait training/transfer training/postural re-education/strengthening/bed mobility training

## 2018-02-07 NOTE — PHYSICAL THERAPY INITIAL EVALUATION ADULT - CRITERIA FOR SKILLED THERAPEUTIC INTERVENTIONS
risk reduction/prevention/functional limitations in following categories/anticipated discharge recommendation/rehab potential/predicted duration of therapy intervention/impairments found/therapy frequency

## 2018-02-07 NOTE — PHYSICAL THERAPY INITIAL EVALUATION ADULT - IMPAIRMENTS FOUND, PT EVAL
muscle strength/posture/ergonomics and body mechanics/aerobic capacity/endurance/gait, locomotion, and balance

## 2018-02-07 NOTE — PHYSICAL THERAPY INITIAL EVALUATION ADULT - MODIFIED CLINICAL TEST OF SENSORY INTEGRATION IN BALANCE TEST
Barthel Index: Feeding Score 10_/10__, Bathing Score _0/5__, Grooming Score 5_/5__, Dressing Score5 _/10__, Bowels Score _10_/10_, Bladder Score _10/10__, Toilet Score 5_/10__, Transfers Score 10_/15__, Mobility Score _0_/15_, Stairs Score _0/10__,     Total Score _55__/100

## 2018-02-07 NOTE — PHYSICAL THERAPY INITIAL EVALUATION ADULT - TINETTI GAIT TEST, REHAB EVAL
Indication of gait -1/1,   Step Length and height -1/2,   Foot Clearance -2/2,   Step Symmetry -1 /1,  Step Continuity -0/1,   Path -1/ 2,   Trunk -1/2,   Walking Time -/11,   Total Score - 8/12

## 2018-02-07 NOTE — PROGRESS NOTE ADULT - SUBJECTIVE AND OBJECTIVE BOX
INTERVAL HPI/OVERNIGHT EVENTS:  Pt seen and examined at bedside.     Allergies/Intolerance: No Known Allergies      MEDICATIONS  (STANDING):  amLODIPine   Tablet 10 milliGRAM(s) Oral daily  atorvastatin 20 milliGRAM(s) Oral <User Schedule>  docusate sodium 100 milliGRAM(s) Oral three times a day  doxazosin 8 milliGRAM(s) Oral at bedtime  losartan 100 milliGRAM(s) Oral daily  metoprolol     tartrate 50 milliGRAM(s) Oral two times a day    MEDICATIONS  (PRN):  aluminum hydroxide/magnesium hydroxide/simethicone Suspension 30 milliLiter(s) Oral every 12 hours PRN Indigestion  diphenhydrAMINE   Injectable 12.5 milliGRAM(s) IV Push every 4 hours PRN Itching  oxyCODONE    5 mG/acetaminophen 325 mG 1 Tablet(s) Oral every 4 hours PRN Moderate Pain  oxyCODONE    5 mG/acetaminophen 325 mG 2 Tablet(s) Oral every 6 hours PRN Severe Pain        ROS: all systems reviewed and wnl      PHYSICAL EXAMINATION:  Vital Signs Last 24 Hrs  T(C): 36.1 (07 Feb 2018 06:21), Max: 36.3 (07 Feb 2018 04:00)  T(F): 97 (07 Feb 2018 06:21), Max: 97.3 (07 Feb 2018 04:00)  HR: 68 (07 Feb 2018 11:29) (53 - 78)  BP: 128/71 (07 Feb 2018 11:29) (94/40 - 148/81)  BP(mean): --  RR: 16 (07 Feb 2018 11:29) (14 - 20)  SpO2: 95% (07 Feb 2018 11:29) (92% - 100%)  CAPILLARY BLOOD GLUCOSE          02-06 @ 07:01 - 02-07 @ 07:00  --------------------------------------------------------  IN: 2312 mL / OUT: 1380 mL / NET: 932 mL    02-07 @ 07:01 - 02-07 @ 12:55  --------------------------------------------------------  IN: 0 mL / OUT: 90 mL / NET: -90 mL        GENERAL:   NECK: supple, No JVD  CHEST/LUNG: clear to auscultation bilaterally; no rales, rhonchi, or wheezing b/l  HEART: normal S1, S2  ABDOMEN: BS+, soft, ND, NT   EXTREMITIES:  pulses palpable; no clubbing, cyanosis, or edema b/l LEs  SKIN: no rashes or lesions      LABS:                        11.7   11.98 )-----------( 143      ( 07 Feb 2018 06:38 )             34.1     02-07    140  |  110<H>  |  24<H>  ----------------------------<  104<H>  5.1   |  23  |  1.19    Ca    7.6<L>      07 Feb 2018 06:38 INTERVAL HPI/OVERNIGHT EVENTS:  Pt seen and examined at bedside.     Allergies/Intolerance: No Known Allergies      MEDICATIONS  (STANDING):  amLODIPine   Tablet 10 milliGRAM(s) Oral daily  atorvastatin 20 milliGRAM(s) Oral <User Schedule>  docusate sodium 100 milliGRAM(s) Oral three times a day  doxazosin 8 milliGRAM(s) Oral at bedtime  losartan 100 milliGRAM(s) Oral daily  metoprolol     tartrate 50 milliGRAM(s) Oral two times a day    MEDICATIONS  (PRN):  aluminum hydroxide/magnesium hydroxide/simethicone Suspension 30 milliLiter(s) Oral every 12 hours PRN Indigestion  diphenhydrAMINE   Injectable 12.5 milliGRAM(s) IV Push every 4 hours PRN Itching  oxyCODONE    5 mG/acetaminophen 325 mG 1 Tablet(s) Oral every 4 hours PRN Moderate Pain  oxyCODONE    5 mG/acetaminophen 325 mG 2 Tablet(s) Oral every 6 hours PRN Severe Pain        ROS: all systems reviewed and wnl      PHYSICAL EXAMINATION:  Vital Signs Last 24 Hrs  T(C): 36.1 (07 Feb 2018 06:21), Max: 36.3 (07 Feb 2018 04:00)  T(F): 97 (07 Feb 2018 06:21), Max: 97.3 (07 Feb 2018 04:00)  HR: 68 (07 Feb 2018 11:29) (53 - 78)  BP: 128/71 (07 Feb 2018 11:29) (94/40 - 148/81)  BP(mean): --  RR: 16 (07 Feb 2018 11:29) (14 - 20)  SpO2: 95% (07 Feb 2018 11:29) (92% - 100%)  CAPILLARY BLOOD GLUCOSE          02-06 @ 07:01 - 02-07 @ 07:00  --------------------------------------------------------  IN: 2312 mL / OUT: 1380 mL / NET: 932 mL    02-07 @ 07:01 - 02-07 @ 12:55  --------------------------------------------------------  IN: 0 mL / OUT: 90 mL / NET: -90 mL        GENERAL: seen on 1 E, comfortable, eating lunch, no CP, SOB or fevers  NECK: supple, No JVD  CHEST/LUNG: clear to auscultation bilaterally; no rales, rhonchi, or wheezing b/l  HEART: normal S1, S2  ABDOMEN: BS+, soft, ND, NT   EXTREMITIES:  pulses palpable; no clubbing, cyanosis, or edema b/l LEs. Whitman in place.   SKIN: no rashes or lesions      LABS:                        11.7   11.98 )-----------( 143      ( 07 Feb 2018 06:38 )             34.1     02-07    140  |  110<H>  |  24<H>  ----------------------------<  104<H>  5.1   |  23  |  1.19    Ca    7.6<L>      07 Feb 2018 06:38

## 2018-02-07 NOTE — PHYSICAL THERAPY INITIAL EVALUATION ADULT - TINETTI BALANCE TEST, REHAB EVAL
Sitting Balance - 1/1 , Rises From Chair - 1/2, Attempts to rise - 1/2 , Immediate Standing Balance - 1/2,  Standing Balance -2 /2, Nudged -  2/2, Eyes Closed - 1/1,  Turning 360 Deg - 1 /2, Sitting down - 1/2, Balance Score - 11/16

## 2018-02-08 DIAGNOSIS — R06.02 SHORTNESS OF BREATH: ICD-10-CM

## 2018-02-08 DIAGNOSIS — M54.16 RADICULOPATHY, LUMBAR REGION: ICD-10-CM

## 2018-02-08 DIAGNOSIS — I25.10 ATHEROSCLEROTIC HEART DISEASE OF NATIVE CORONARY ARTERY WITHOUT ANGINA PECTORIS: ICD-10-CM

## 2018-02-08 LAB
ALBUMIN SERPL ELPH-MCNC: 2.9 G/DL — LOW (ref 3.3–5)
ALP SERPL-CCNC: 75 U/L — SIGNIFICANT CHANGE UP (ref 40–120)
ALT FLD-CCNC: 20 U/L — SIGNIFICANT CHANGE UP (ref 12–78)
ANION GAP SERPL CALC-SCNC: 7 MMOL/L — SIGNIFICANT CHANGE UP (ref 5–17)
APTT BLD: 28.2 SEC — SIGNIFICANT CHANGE UP (ref 27.5–37.4)
AST SERPL-CCNC: 30 U/L — SIGNIFICANT CHANGE UP (ref 15–37)
BASE EXCESS BLDA CALC-SCNC: -3.2 MMOL/L — LOW (ref -2–2)
BASE EXCESS BLDA CALC-SCNC: -4.2 MMOL/L — LOW (ref -2–2)
BASE EXCESS BLDA CALC-SCNC: -4.5 MMOL/L — LOW (ref -2–2)
BASOPHILS # BLD AUTO: 0.05 K/UL — SIGNIFICANT CHANGE UP (ref 0–0.2)
BASOPHILS NFR BLD AUTO: 0.4 % — SIGNIFICANT CHANGE UP (ref 0–2)
BILIRUB SERPL-MCNC: 0.4 MG/DL — SIGNIFICANT CHANGE UP (ref 0.2–1.2)
BLOOD GAS COMMENTS: SIGNIFICANT CHANGE UP
BLOOD GAS SOURCE: SIGNIFICANT CHANGE UP
BUN SERPL-MCNC: 24 MG/DL — HIGH (ref 7–23)
CALCIUM SERPL-MCNC: 8.3 MG/DL — LOW (ref 8.5–10.1)
CHLORIDE SERPL-SCNC: 110 MMOL/L — HIGH (ref 96–108)
CK MB BLD-MCNC: 3.3 % — SIGNIFICANT CHANGE UP (ref 0–3.5)
CK MB BLD-MCNC: 3.5 % — SIGNIFICANT CHANGE UP (ref 0–3.5)
CK MB CFR SERPL CALC: 14.2 NG/ML — HIGH (ref 0.5–3.6)
CK MB CFR SERPL CALC: 9 NG/ML — HIGH (ref 0.5–3.6)
CK SERPL-CCNC: 275 U/L — HIGH (ref 26–192)
CK SERPL-CCNC: 337 U/L — HIGH (ref 26–192)
CK SERPL-CCNC: 407 U/L — HIGH (ref 26–192)
CO2 SERPL-SCNC: 23 MMOL/L — SIGNIFICANT CHANGE UP (ref 22–31)
CREAT SERPL-MCNC: 1.29 MG/DL — SIGNIFICANT CHANGE UP (ref 0.5–1.3)
EOSINOPHIL # BLD AUTO: 0.2 K/UL — SIGNIFICANT CHANGE UP (ref 0–0.5)
EOSINOPHIL NFR BLD AUTO: 1.7 % — SIGNIFICANT CHANGE UP (ref 0–6)
FLUAV SPEC QL CULT: NEGATIVE — SIGNIFICANT CHANGE UP
FLUBV AG SPEC QL IA: NEGATIVE — SIGNIFICANT CHANGE UP
GLUCOSE SERPL-MCNC: 114 MG/DL — HIGH (ref 70–99)
HCO3 BLDA-SCNC: 20 MMOL/L — LOW (ref 21–29)
HCO3 BLDA-SCNC: 23 MMOL/L — SIGNIFICANT CHANGE UP (ref 21–29)
HCO3 BLDA-SCNC: 25 MMOL/L — SIGNIFICANT CHANGE UP (ref 21–29)
HCT VFR BLD CALC: 36 % — SIGNIFICANT CHANGE UP (ref 34.5–45)
HGB BLD-MCNC: 12 G/DL — SIGNIFICANT CHANGE UP (ref 11.5–15.5)
HOROWITZ INDEX BLDA+IHG-RTO: 100 — SIGNIFICANT CHANGE UP
HOROWITZ INDEX BLDA+IHG-RTO: 100 — SIGNIFICANT CHANGE UP
HOROWITZ INDEX BLDA+IHG-RTO: 50 — SIGNIFICANT CHANGE UP
IMM GRANULOCYTES NFR BLD AUTO: 0.5 % — SIGNIFICANT CHANGE UP (ref 0–1.5)
INR BLD: 1.03 RATIO — SIGNIFICANT CHANGE UP (ref 0.88–1.16)
LACTATE SERPL-SCNC: 0.8 MMOL/L — SIGNIFICANT CHANGE UP (ref 0.7–2)
LYMPHOCYTES # BLD AUTO: 0.98 K/UL — LOW (ref 1–3.3)
LYMPHOCYTES # BLD AUTO: 8.3 % — LOW (ref 13–44)
MAGNESIUM SERPL-MCNC: 1.7 MG/DL — SIGNIFICANT CHANGE UP (ref 1.6–2.6)
MCHC RBC-ENTMCNC: 29.8 PG — SIGNIFICANT CHANGE UP (ref 27–34)
MCHC RBC-ENTMCNC: 33.3 GM/DL — SIGNIFICANT CHANGE UP (ref 32–36)
MCV RBC AUTO: 89.3 FL — SIGNIFICANT CHANGE UP (ref 80–100)
MONOCYTES # BLD AUTO: 0.91 K/UL — HIGH (ref 0–0.9)
MONOCYTES NFR BLD AUTO: 7.7 % — SIGNIFICANT CHANGE UP (ref 2–14)
NEUTROPHILS # BLD AUTO: 9.56 K/UL — HIGH (ref 1.8–7.4)
NEUTROPHILS NFR BLD AUTO: 81.4 % — HIGH (ref 43–77)
PCO2 BLDA: 34 MMHG — SIGNIFICANT CHANGE UP (ref 32–46)
PCO2 BLDA: 49 MMHG — HIGH (ref 32–46)
PCO2 BLDA: 67 MMHG — HIGH (ref 32–46)
PH BLD: 7.19 — CRITICAL LOW (ref 7.35–7.45)
PH BLD: 7.29 — LOW (ref 7.35–7.45)
PH BLD: 7.37 — SIGNIFICANT CHANGE UP (ref 7.35–7.45)
PHOSPHATE SERPL-MCNC: 3.1 MG/DL — SIGNIFICANT CHANGE UP (ref 2.5–4.5)
PLATELET # BLD AUTO: 138 K/UL — LOW (ref 150–400)
PO2 BLDA: 119 MMHG — HIGH (ref 74–108)
PO2 BLDA: 51 MMHG — LOW (ref 74–108)
PO2 BLDA: 56 MMHG — LOW (ref 74–108)
POTASSIUM SERPL-MCNC: 4.7 MMOL/L — SIGNIFICANT CHANGE UP (ref 3.5–5.3)
POTASSIUM SERPL-SCNC: 4.7 MMOL/L — SIGNIFICANT CHANGE UP (ref 3.5–5.3)
PROT SERPL-MCNC: 5.9 GM/DL — LOW (ref 6–8.3)
PROTHROM AB SERPL-ACNC: 11.2 SEC — SIGNIFICANT CHANGE UP (ref 9.8–12.7)
RBC # BLD: 4.03 M/UL — SIGNIFICANT CHANGE UP (ref 3.8–5.2)
RBC # FLD: 15.6 % — HIGH (ref 10.3–14.5)
SAO2 % BLDA: 83 % — LOW (ref 92–96)
SAO2 % BLDA: 83 % — LOW (ref 92–96)
SAO2 % BLDA: 99 % — HIGH (ref 92–96)
SODIUM SERPL-SCNC: 140 MMOL/L — SIGNIFICANT CHANGE UP (ref 135–145)
TROPONIN I SERPL-MCNC: 1.45 NG/ML — HIGH (ref 0.01–0.04)
TROPONIN I SERPL-MCNC: 1.55 NG/ML — HIGH (ref 0.01–0.04)
TROPONIN I SERPL-MCNC: 1.87 NG/ML — HIGH (ref 0.01–0.04)
TROPONIN I SERPL-MCNC: 2.07 NG/ML — HIGH (ref 0.01–0.04)
WBC # BLD: 11.76 K/UL — HIGH (ref 3.8–10.5)
WBC # FLD AUTO: 11.76 K/UL — HIGH (ref 3.8–10.5)

## 2018-02-08 PROCEDURE — 71275 CT ANGIOGRAPHY CHEST: CPT | Mod: 26

## 2018-02-08 PROCEDURE — 71045 X-RAY EXAM CHEST 1 VIEW: CPT | Mod: 26

## 2018-02-08 PROCEDURE — 99233 SBSQ HOSP IP/OBS HIGH 50: CPT

## 2018-02-08 PROCEDURE — 99223 1ST HOSP IP/OBS HIGH 75: CPT

## 2018-02-08 PROCEDURE — 93970 EXTREMITY STUDY: CPT | Mod: 26

## 2018-02-08 PROCEDURE — 93306 TTE W/DOPPLER COMPLETE: CPT | Mod: 26

## 2018-02-08 RX ORDER — MIDAZOLAM HYDROCHLORIDE 1 MG/ML
4 INJECTION, SOLUTION INTRAMUSCULAR; INTRAVENOUS ONCE
Qty: 0 | Refills: 0 | Status: DISCONTINUED | OUTPATIENT
Start: 2018-02-08 | End: 2018-02-08

## 2018-02-08 RX ORDER — FUROSEMIDE 40 MG
40 TABLET ORAL ONCE
Qty: 0 | Refills: 0 | Status: COMPLETED | OUTPATIENT
Start: 2018-02-08 | End: 2018-02-08

## 2018-02-08 RX ORDER — PROPOFOL 10 MG/ML
5 INJECTION, EMULSION INTRAVENOUS
Qty: 1000 | Refills: 0 | Status: DISCONTINUED | OUTPATIENT
Start: 2018-02-08 | End: 2018-02-08

## 2018-02-08 RX ORDER — CHLORHEXIDINE GLUCONATE 213 G/1000ML
15 SOLUTION TOPICAL
Qty: 0 | Refills: 0 | Status: DISCONTINUED | OUTPATIENT
Start: 2018-02-08 | End: 2018-02-09

## 2018-02-08 RX ORDER — ASPIRIN/CALCIUM CARB/MAGNESIUM 324 MG
325 TABLET ORAL DAILY
Qty: 0 | Refills: 0 | Status: DISCONTINUED | OUTPATIENT
Start: 2018-02-08 | End: 2018-02-12

## 2018-02-08 RX ORDER — SODIUM CHLORIDE 9 MG/ML
1000 INJECTION INTRAMUSCULAR; INTRAVENOUS; SUBCUTANEOUS
Qty: 0 | Refills: 0 | Status: DISCONTINUED | OUTPATIENT
Start: 2018-02-08 | End: 2018-02-08

## 2018-02-08 RX ORDER — IPRATROPIUM/ALBUTEROL SULFATE 18-103MCG
3 AEROSOL WITH ADAPTER (GRAM) INHALATION EVERY 6 HOURS
Qty: 0 | Refills: 0 | Status: DISCONTINUED | OUTPATIENT
Start: 2018-02-08 | End: 2018-02-09

## 2018-02-08 RX ORDER — NITROGLYCERIN 6.5 MG
1 CAPSULE, EXTENDED RELEASE ORAL ONCE
Qty: 0 | Refills: 0 | Status: COMPLETED | OUTPATIENT
Start: 2018-02-08 | End: 2018-02-08

## 2018-02-08 RX ADMIN — Medication 100 MILLIGRAM(S): at 17:29

## 2018-02-08 RX ADMIN — Medication 3 MILLILITER(S): at 23:52

## 2018-02-08 RX ADMIN — Medication 1 INCH(S): at 04:16

## 2018-02-08 RX ADMIN — Medication 325 MILLIGRAM(S): at 11:19

## 2018-02-08 RX ADMIN — Medication 50 MILLIGRAM(S): at 05:53

## 2018-02-08 RX ADMIN — Medication 1 INCH(S): at 17:30

## 2018-02-08 RX ADMIN — Medication 100 MILLIGRAM(S): at 10:13

## 2018-02-08 RX ADMIN — SODIUM CHLORIDE 60 MILLILITER(S): 9 INJECTION INTRAMUSCULAR; INTRAVENOUS; SUBCUTANEOUS at 13:59

## 2018-02-08 RX ADMIN — Medication 100 MILLIGRAM(S): at 00:32

## 2018-02-08 RX ADMIN — Medication 40 MILLIGRAM(S): at 04:01

## 2018-02-08 RX ADMIN — Medication 8 MILLIGRAM(S): at 22:12

## 2018-02-08 NOTE — CONSULT NOTE ADULT - ASSESSMENT
78 year old female PMH of HTN, HLD, CABG ~2yrs ago and ?valve replacement; admitted for elective lumbar spinal laminectomy and spinal fusion. Had pre-op chemical stress test 2 months prior to operation and was reported to patient as normal.     RRT called by RN 2/8 am due to SOB and O2 desaturating to 84% on NC. Patient c/o SOB that started while patient was resting in bed. Denied chest pain, dizziness.  CXR revealed vasc congestion; Lasix was given and she improved.  Feeling well currently; back to baseline.  troponin 1.5 -> 2.0    Concern for ACS; compromised graft.  Currently asymptomatic and feeling well.    -monitor on tele  -trend Bob  -2D echo  -obtain records from [place]; ?valve replacement as well  -heparin gtt if ok as per neuro  -cont asa/metoprolol/statin  -diuresis with lasix as needed; stable currently

## 2018-02-08 NOTE — PROGRESS NOTE ADULT - SUBJECTIVE AND OBJECTIVE BOX
RRT note / progress note:    RRT called by RN due to SOB and O2 desaturating to 84% on NC. Pt seen and examined at bedside. Patient c/o SOB that started a few minutes ago while patient was resting in bed. Denies chest pain, dizziness. Post-op pain is well controlled on pain medication. Pt tolerating diet. Pt denies nausea and vomiting. During ambulation with physical therapy yesterday, O2 desaturation was noted but patient denies SOB yesterday.     RRT vitals: Temp: 99.6 rectal, BP:141/62, HR: 87, spO2:84% on NC, 119 sugar  spO2 improved to 94% on NRB    PHYSICAL EXAM:  GENERAL: NAD  CHEST/LUNG: Clear to ausculation, bilaterally   HEART: RRR S1S2  ABDOMEN: non distended, +BS, soft, non tender, no guarding  BACK: Dressing clean/dry/intact. JR with small amount of serosanguinous output  EXTREMITIES:  calf soft, non tender b/l. no edema b/l.        Assessment: 77 y/o female s/p laminectomy and fusion L3-5. s/p 2uPRBC on 2/6. RRT for SOB and O2 desaturating to 84%. Likely volume overload due to PRBC. Patient's SOB improved and O2 increased to 97%.    Plan:  -NRB  -Stat labs: cbc with diff, cmp, mag, phos, cardiac enzymes, lactate, coags, blood cx x 2, BNP  -Emergent CXR: shows CHF  -Stat ABG  -Stat EKG: shows NSR at 85bpm. no acute changes  -Transfer to telemetry, continuous pulse ox  -Lasix 40 IV push, nitro paste  -BiPAP if symptoms worsen  -f/u labs  -Discussed with nocturnist  -Will discuss with Dr. Leal

## 2018-02-08 NOTE — PROGRESS NOTE ADULT - PROBLEM SELECTOR PLAN 1
s/p spinal fusion and laminectomy  post -op care as per ortho  pain management with bowel regimen  OT/PT  incentive spirometer

## 2018-02-08 NOTE — CONSULT NOTE ADULT - ASSESSMENT
MARIELA HENDRICKS [facility] [phone]   ALLERGY nka  CONTACT Child Bree [place]in[place] [phone] Self [phone] Dtr Bree [place] [phone]   REASON FOR VISIT   Initial evaluation/Pulmonary consultation requested  2/8/2018 from Surgery PA and Dr Beau Leal   Patient examined chart reviewed  HOSPITAL ADMISSION LVS Dr Beau Leal  2/6/2018   PATIENT CAME  FROM (if information available)    VITALS/LABS  2/8/2018 11a afeb 70 120/45  2/8/2018 NS 60   2/8/2018 W 11.7 Hb 12 Plt 138 INR 1 Na 140 K 4.7 CO2 23 Cr 1.2   HOSPITAL COURSE PROBLEM ASSESSMENT PLAN 2/6/2018 ADMISSION   LVS                                       RESP  2/8 100% 737/34/119            RO MI  2/8  2/8 Tr 1 1.5-2   (2/8) Atorvastat 20 (2/6) Losartan 100 (2/6) Metoprolol 50 .2 (2/6)   2/8/2018 DW Dr Coker Requested ICU eval ASAP (Called at 150p 2/8) for poss ac mi and chf   HTN  Norvasc 10 (2/6)   RO VTE   2/8 DW Mr Behan CTA ch requested and V dupolex requested   GLOBAL ISSUE/BEST PRACTICE:        PROBLEM: Analgesia:     na                        PROBLEM: Sedation:     na               PROBLEM: HOB elevation:   y             PROBLEM: Stress ulcer proph:    na                      PROBLEM: VTE prophylaxis:       (2/8)                   PROBLEM: Glycemic control:    na   PROBLEM: Nutrition:    Reg (2/7)           PROBLEM: Advanced directive: na     PROBLEM: Allergies:  na  HOSPITAL COURSE PROBLEM ASSESSMENT PLAN 2/6/2018 ADMISSION   LVS                                78 year old female PMH of HTN, HLD, ASHD, admitted LVS 2/6/2018 for elective lumbar spinal laminectomy and spinal fusion. Had pre-op chemical stress test 2 months prior to operation and was reported to patient as normal.   PSH: Left cartoid endarterectomy, CABG.    RRT called by RN 2/8 am due to SOB and O2 desaturating to 84% on NC. Patient c/o SOB that started while patient was resting in bed. Denies chest pain, dizziness. Post-op pain is well controlled on pain medication. Pt tolerating diet. Pt denies nausea and vomiting. During ambulation with physical therapy on 2/7 O2 desaturation was noted but patient denies SOB yesterday.   CXR showed CHF and Lasix was given  Pulm consulted 2/8/2018 as she was desaturating and PaO2 on 100% nrb was only 110   PLAN 2/8/2018   RO MI RO VTE Supportive care O2 Monitor gas exchange   I dw Dr Coker 2/8 150p for poss icu transfer as her troponins are elevated  Meanwhile will coyle to exclude vte       TIME SPENT Over 55 minutes aggregate care time spent on encounter; activities included   direct patient care, counseling and/or coordinating care reviewing notes, lab data/ imaging , discussion with multidisciplinary team/ patient  /family. Risks, benefits, alternatives  discussed in detail. Proper antibiotic use issues addressed Questions/concerns  were addressed  as  best as possible

## 2018-02-08 NOTE — PROGRESS NOTE ADULT - PROBLEM SELECTOR PLAN 2
concern for PE, but CTA and dopplers negative,   now with acute resp hypoxic and hypercarbic failure, acidotic, being managed in ICU  pulm and critical care now following

## 2018-02-08 NOTE — PROGRESS NOTE ADULT - ASSESSMENT
77 yo F with h/o afib, copd, HTn, PVD, CAD (s/p CABG and AVR), carotid stenosis, s/p endarterectomy, bladder and colon cancer (s/p chemo 2013), former smoker, current every day EtOH 3-4 drinks per day admitted with lumbar radiculopathy s/p spinal fusion and laminectomy

## 2018-02-08 NOTE — CONSULT NOTE ADULT - ATTENDING COMMENTS
Agree with above.  AMS with Type 2 respiratory failure.  Now doing much better.  Admits former smoker of ~70 pack-yr history, not on inhalers. Not on CPAP (states negative sleep study).  Check RVP.  Trend troponins.  Recommend NIPPV overnight.  SCDs for DVT ppx.    Total attending critical care time spent: 35 minutes

## 2018-02-08 NOTE — PROVIDER CONTACT NOTE (CRITICAL VALUE NOTIFICATION) - ASSESSMENT
Patient is sleeping comfortably at present, placed on telemetry monitoring with continuous pulse ox monitoring.

## 2018-02-08 NOTE — CHART NOTE - NSCHARTNOTEFT_GEN_A_CORE
Patient seen and examined during RRT in radiology called for hypoxia. Patient complaining of dull substernal chest pain and shortness of breath.     Vital Signs Last 24 Hrs  T(F): 99.3 (02-08-18 @ 16:00), Max: 99.6 (02-08-18 @ 03:50)  HR: 88 (02-08-18 @ 16:00)  BP: 142/50 (02-08-18 @ 16:00)  RR: 29 (02-08-18 @ 16:00)  SpO2: 100% (02-08-18 @ 16:00)  POCT Blood Glucose.: 119 mg/dL (08 Feb 2018 03:25)    GENERAL: Alert, respiratory distress  CHEST/LUNG: Increased respiratory effort, bilateral wheezing  HEART: S1S2, Regular rate and rhythm;   BACK: unable to turn patient at time of RRT, but JR noted with serosanguineous output.     I&O's Detail    07 Feb 2018 07:01  -  08 Feb 2018 07:00  --------------------------------------------------------  IN:    lactated ringers.: 500 mL    Oral Fluid: 320 mL    Solution: 100 mL  Total IN: 920 mL    OUT:    Bulb: 382.5 mL    Indwelling Catheter - Urethral: 750 mL    Voided: 300 mL  Total OUT: 1432.5 mL    Total NET: -512.5 mL    08 Feb 2018 07:01  -  08 Feb 2018 16:47  --------------------------------------------------------  IN:    Oral Fluid: 240 mL  Total IN: 240 mL    OUT:    Bulb: 80 mL  Total OUT: 80 mL    Total NET: 160 mL    LABS:                        12.0   11.76 )-----------( 138      ( 08 Feb 2018 03:53 )             36.0     02-08    140  |  110<H>  |  24<H>  ----------------------------<  114<H>  4.7   |  23  |  1.29    Ca    8.3<L>      08 Feb 2018 03:53  Phos  3.1     02-08  Mg     1.7     02-08    TPro  5.9<L>  /  Alb  2.9<L>  /  TBili  0.4  /  DBili  x   /  AST  30  /  ALT  20  /  AlkPhos  75  02-08    PT/INR - ( 08 Feb 2018 12:59 )   PT: 11.2 sec;   INR: 1.03 ratio      PTT - ( 08 Feb 2018 12:59 )  PTT:28.2 sec    Troponin I, Serum: 1.870 (2/8 15:51)  Troponin I, Serum: 2.070 (2/8 12:59)  Troponin I, Serum: 1.550 (2/8 3:53)    Blood Gas Profile - Arterial (02.08.18 @ 16:37)    Blood Gas Source: Arterial    Blood Gas Comments: ,    pH, Blood: 7.29    pCO2, Arterial: 49 mmHg    pO2, Arterial: 51 mmHg    HCO3, Arterial: 23 mmol/L    Base Excess, Arterial: -3.2 mmol/L    Oxygen Saturation, Arterial: 83 %    FIO2, Arterial: 50.0    Blood Gas Profile - Arterial (02.08.18 @ 15:36)    Blood Gas Source: Arterial    pH, Blood: 7.19: TYPE:(C=Critical, N=Notification, A=Abnormal)c _  TESTS: _gas  DATE/TIME CALLED: _02/08/18 15:41  CALLED TO: wood PARHAM  READ BACK (2 Patient Identifiers)(Y/N): _y  READ BACK VALUES (Y/N): y_  CALLED BY: [email or web address]    Blood Gas Comments: test performed and positive    pCO2, Arterial: 67 mmHg    pO2, Arterial: 56 mmHg    HCO3, Arterial: 25 mmol/L    Base Excess, Arterial: -4.2 mmol/L    Oxygen Saturation, Arterial: 83 %    FIO2, Arterial: 100.0    RADIOLOGY & ADDITIONAL STUDIES:  from: CT Angio Chest w/ IV Cont (02.08.18 @ 14:49): IMPRESSION: No evidence of emboli. There are bilateral atelectatic infiltrates in the lower lobes with adjacent mild effusions and some lingular area atelectasis as well. There are prominent central mediastinal and hilar lymph nodes which could well be reactive. There is some mucoid material in the trachea. Clips in the lower esophageal area. Mild compression T11 likely old. Sternotomy and prosthetic aortic valve. Incidental findings as above.    from: US Duplex Venous Lower Ext Complete, Bilateral (02.08.18 @ 15:18): No evidence of DVT.       EKG done during rapid shows NSR with few PVC.     Assessment: 77 y/o female s/p laminectomy and fusion L3-5. s/p 2uPRBC on 2/6. S/p RRT x2 on 2/8 secondary to desaturation. Now acidotic with hypoxia and hypercarbia, suspect NSTEMI with possible COPD and fluid overload components. Duoneb treatment, lasix, and Solumedrol given during RRT. Patient transferred to CCU for management and treatment    Plan:  -Patient transferred to CCU for treatment  -if heparin drip or similar AC needed for treatment of NSTEMI it may be started  -continue JR, monitor output, if on AC watch output closely and do not remove  -continue to trend H/H  -continue BIPAP, trend ABG, patient may require intubation if patient does not improve and O2 remains low  -close follow up by cardiology and pulmonology  -Discuss with Dr. Leal

## 2018-02-08 NOTE — PROVIDER CONTACT NOTE (CRITICAL VALUE NOTIFICATION) - BACKGROUND
Patient is s/p postrior lumabr spinal fusion. complained of shortness of breath, denied any chest pain, has history of CABGx2, RRT initiated. Labs drawn during RRT.

## 2018-02-08 NOTE — CHART NOTE - NSCHARTNOTEFT_GEN_A_CORE
Case discussed with Dr. Myers. She suggested a pulmonary consult.  Dr. Pink called for pulmonary consult. He wants a CTA to R/O PE - ordered. Case discussed with Dr. Myers. She suggested a pulmonary consult.  Dr. Pink called for pulmonary consult. He wants a CTA to R/O PE - ordered.  Spoke with Dr. Leal about above. He understands & agrees to the w/u.

## 2018-02-08 NOTE — CHART NOTE - NSCHARTNOTEFT_GEN_A_CORE
Called for elevated troponin by medicine. Medicine recommended aspirin anticoagulation. Discussed with Dr. Leal to start  daily. Will follow cardiac enzymes and call a cardiology consult.

## 2018-02-08 NOTE — CONSULT NOTE ADULT - SUBJECTIVE AND OBJECTIVE BOX
CARDIOLOGY CONSULT NOTE    HPI:  78 year old female PMH of HTN, HLD, CABG ~2yrs ago and ?valve replacement; admitted for elective lumbar spinal laminectomy and spinal fusion. Had pre-op chemical stress test 2 months prior to operation and was reported to patient as normal.     RRT called by RN 2/8 am due to SOB and O2 desaturating to 84% on NC. Patient c/o SOB that started while patient was resting in bed. Denied chest pain, dizziness.  CXR revealed vasc congestion; Lasix was given and she improved.  Feeling well currently; back to baseline.  troponin 1.5 -> 2.0      REVIEW OF SYSTEMS:    CONSTITUTIONAL: No fever, weight loss, or fatigue  EYES: No eye pain, visual disturbances, or discharge  ENMT:  No difficulty hearing, tinnitus, vertigo; No sinus or throat pain  NECK: No pain or stiffness  BREASTS: No pain, masses, or nipple discharge  RESPIRATORY: No cough, wheezing, chills or hemoptysis; No shortness of breath  CARDIOVASCULAR: No chest pain, palpitations, dizziness, or leg swelling  GASTROINTESTINAL: No abdominal or epigastric pain. No nausea, vomiting, or hematemesis; No diarrhea or constipation. No melena or hematochezia.  GENITOURINARY: No dysuria, frequency, hematuria, or incontinence  NEUROLOGICAL: No headaches, memory loss, loss of strength, numbness, or tremors  SKIN: No itching, burning, rashes, or lesions   LYMPH NODES: No enlarged glands  ENDOCRINE: No heat or cold intolerance; No hair loss  MUSCULOSKELETAL: No joint pain or swelling; No muscle, back, or extremity pain  PSYCHIATRIC: No depression, anxiety, mood swings, or difficulty sleeping  HEME/LYMPH: No easy bruising, or bleeding gums  ALLERGY AND IMMUNOLOGIC: No hives or eczema    MEDICATIONS  (STANDING):  amLODIPine   Tablet 10 milliGRAM(s) Oral daily  aspirin enteric coated 325 milliGRAM(s) Oral daily  atorvastatin 20 milliGRAM(s) Oral <User Schedule>  ceFAZolin   IVPB 2000 milliGRAM(s) IV Intermittent every 8 hours  ceFAZolin   IVPB      docusate sodium 100 milliGRAM(s) Oral three times a day  doxazosin 8 milliGRAM(s) Oral at bedtime  losartan 100 milliGRAM(s) Oral daily  metoprolol     tartrate 50 milliGRAM(s) Oral two times a day  senna 2 Tablet(s) Oral at bedtime  sodium chloride 0.9%. 1000 milliLiter(s) (60 mL/Hr) IV Continuous <Continuous>    MEDICATIONS  (PRN):  aluminum hydroxide/magnesium hydroxide/simethicone Suspension 30 milliLiter(s) Oral every 12 hours PRN Indigestion  diphenhydrAMINE   Injectable 12.5 milliGRAM(s) IV Push every 4 hours PRN Itching  oxyCODONE    5 mG/acetaminophen 325 mG 1 Tablet(s) Oral every 4 hours PRN Moderate Pain  oxyCODONE    5 mG/acetaminophen 325 mG 2 Tablet(s) Oral every 6 hours PRN Severe Pain      ALLERGIES: No Known Allergies      FAMILY HISTORY:  Family history of asthma (Mother)      PHYSICAL EXAMINATION:  -----------------------------  T(C): 37.1 (02-08-18 @ 11:00), Max: 37.6 (02-08-18 @ 03:50)  HR: 78 (02-08-18 @ 13:20) (67 - 87)  BP: 111/40 (02-08-18 @ 13:20) (105/68 - 143/53)  RR: 18 (02-08-18 @ 13:20) (15 - 18)  SpO2: 96% (02-08-18 @ 13:20) (84% - 99%)  Wt(kg): --    02-07 @ 07:01  -  02-08 @ 07:00  --------------------------------------------------------  IN:    lactated ringers.: 500 mL    Oral Fluid: 320 mL    Solution: 100 mL  Total IN: 920 mL    OUT:    Bulb: 382.5 mL    Indwelling Catheter - Urethral: 750 mL    Voided: 300 mL  Total OUT: 1432.5 mL    Total NET: -512.5 mL      02-08 @ 07:01  -  02-08 @ 15:28  --------------------------------------------------------  IN:    Oral Fluid: 240 mL  Total IN: 240 mL    OUT:    Bulb: 80 mL  Total OUT: 80 mL    Total NET: 160 mL            Constitutional: well developed, normal appearance, well groomed, well nourished, no deformities and no acute distress.   Eyes: the conjunctiva exhibited no abnormalities and the eyelids demonstrated no xanthelasmas.   HEENT: normal oral mucosa, no oral pallor and no oral cyanosis.   Neck: normal jugular venous A waves present, normal jugular venous V waves present and no jugular venous cordon A waves.   Pulmonary: no respiratory distress, normal respiratory rhythm and effort, no accessory muscle use and lungs were clear to auscultation bilaterally.   Cardiovascular: heart rate and rhythm were normal, normal S1 and S2 and no murmur, gallop, rub, heave or thrill are present.   Abdomen: soft, non-tender, no hepato-splenomegaly and no abdominal mass palpated.   Musculoskeletal: the gait could not be assessed..   Extremities: no clubbing of the fingernails, no localized cyanosis, no petechial hemorrhages and no ischemic changes.   Skin: normal skin color and pigmentation, no rash, no venous stasis, no skin lesions, no skin ulcer and no xanthoma was observed.   Psychiatric: oriented to person, place, and time, the affect was normal, the mood was normal and not feeling anxious.     LABS:   --------  02-08    140  |  110<H>  |  24<H>  ----------------------------<  114<H>  4.7   |  23  |  1.29    Ca    8.3<L>      08 Feb 2018 03:53  Phos  3.1     02-08  Mg     1.7     02-08    TPro  5.9<L>  /  Alb  2.9<L>  /  TBili  0.4  /  DBili  x   /  AST  30  /  ALT  20  /  AlkPhos  75  02-08                         12.0   11.76 )-----------( 138      ( 08 Feb 2018 03:53 )             36.0     PT/INR - ( 08 Feb 2018 12:59 )   PT: 11.2 sec;   INR: 1.03 ratio         PTT - ( 08 Feb 2018 12:59 )  PTT:28.2 sec    02-08 @ 12:59 CPK total:--, CKMB --, Troponin I - 2.070 ng/mL<H>  02-08 @ 03:53 CPK total:--, CKMB --, Troponin I - 1.550 ng/mL<H>

## 2018-02-08 NOTE — CHART NOTE - NSCHARTNOTEFT_GEN_A_CORE
Did f/u visit on pt.  She c/o SOB despite being on nasal cannula 96% sat now.  She does not appear to be in acute distress.  JR has drained 80 ml of sero-sanguenous drainage so far today.  Having cardiology & pulmonary consultation.  Had telephone conversation with Dr Pink. He wants CTA, Duplex of LE, Critical care evaluation for possible ICU admission.  Spoke to Dr Flores in ICU. He will see pt as soon she returns from radiology.  Message left to inform Dr. Leal about happenings to his pt. Did f/u visit on pt.  She c/o SOB despite being on nasal cannula 96% sat now.  She does not appear to be in acute distress.  JR has drained 80 ml of sero-sanguenous drainage so far today.  Having cardiology & pulmonary consultation.  Had telephone conversation with Dr Pink. He wants CTA, Duplex of LE, Critical care evaluation for possible ICU admission.  Spoke to Dr Mathews in ICU. He will see pt as soon she returns from radiology.  Message left to inform Dr. Leal about happenings to his pt.

## 2018-02-08 NOTE — PROGRESS NOTE ADULT - ASSESSMENT
appraciate the care of the ICU/cardiology teams - defer to their judgement regarding the heart/lung issues - if she needs to be anticoagulated then that is okay with me if important in their assessment - drain needs to stay in - PT on hold until cardiopulm stabilized     I called the contact number and left a message with the daughter but she did not answer

## 2018-02-08 NOTE — CONSULT NOTE ADULT - ASSESSMENT
Consult called for 89 Y/O female during RRT for hypoxia and SOB.     1. chest discomfort- ekg, informal reading shows no significant changes from previous, sublingual nitro x 1- pt reports mild improvement, echo ordered, awaiting cardiology f/u  2. hypoxia, SOB, wheeze- ABG- 7.19/67/56/25, lasix 40 IVP x 1, solumedrol 60 IVP x 1, duoneb x 1, Bipap- Pt reports significant improvement in symptoms, repeat ABG 7.29/49/51/23  3. Pt admitted to CCU for further management   4. surgical f/u- states that anticoag ok if necessary  5. Case discussed w/ attending Consult called for 89 Y/O female during RRT for hypoxia and SOB.     1. chest discomfort- ekg, informal reading shows no significant changes from previous, sublingual nitro x 1- pt reports mild improvement, echo ordered, awaiting cardiology f/u  2. hypoxia, SOB, wheeze- ABG- 7.19/67/56/25, lasix 40 IVP x 1, solumedrol 60 IVP x 1, duoneb x 1, Bipap- Pt reports significant improvement in symptoms, repeat ABG 7.29/49/51/23  3. Pt admitted to CCU for further management   4. surgical f/u- states that anticoag ok if necessary  5. Continue duonebs, steroids, diuresis as needed  6. f/u repeat cxr, abg, make O2 changes as needed   7. case discussed w/ attending Consult called for 89 Y/O female during RRT for hypoxia and SOB.     1. chest discomfort- ekg, informal reading shows no significant changes from previous, sublingual nitro x 1- pt reports mild improvement, echo ordered, awaiting cardiology f/u  2. hypoxia, SOB, wheeze- ABG- 7.19/67/56/25, lasix 40 IVP x 1, solumedrol 60 IVP x 1, duoneb x 1, Bipap- Pt reports significant improvement in symptoms, repeat ABG 7.29/49/51/23  3. Pt admitted to CCU for further management   4. surgical f/u- states that anticoag ok if necessary  5. Continue duonebs, steroids, diuresis as needed  6. f/u repeat cxr, abg, make O2 changes as needed   7. f/u influenza, RVP  8. Case discussed w/ attending

## 2018-02-08 NOTE — CONSULT NOTE ADULT - SUBJECTIVE AND OBJECTIVE BOX
RRT called while pt in US for hypoxia and SOB w/ O2 SAT 84% on 100% non rebreather. Pt shows increased work of breathing, admits to SOB, and admits to chest discomfort.     HPI:  78 year old female PMH of HTN, HLD, ASHD, Left cartoid endarterectomy, CABG, admitted for elective lumbar spinal laminectomy and spinal fusion. Pt states that she may have a hx of COPD. Pt s/p posterior spinal fusion L3 L5 laminectomy 18.       Allergies    No Known Allergies    Intolerances    MEDICATIONS  (STANDING):  amLODIPine   Tablet 10 milliGRAM(s) Oral daily  aspirin enteric coated 325 milliGRAM(s) Oral daily  atorvastatin 20 milliGRAM(s) Oral <User Schedule>  ceFAZolin   IVPB 2000 milliGRAM(s) IV Intermittent every 8 hours  ceFAZolin   IVPB      chlorhexidine 0.12% Liquid 15 milliLiter(s) Swish and Spit two times a day  docusate sodium 100 milliGRAM(s) Oral three times a day  doxazosin 8 milliGRAM(s) Oral at bedtime  losartan 100 milliGRAM(s) Oral daily  metoprolol     tartrate 50 milliGRAM(s) Oral two times a day  senna 2 Tablet(s) Oral at bedtime    MEDICATIONS  (PRN):  aluminum hydroxide/magnesium hydroxide/simethicone Suspension 30 milliLiter(s) Oral every 12 hours PRN Indigestion  diphenhydrAMINE   Injectable 12.5 milliGRAM(s) IV Push every 4 hours PRN Itching  oxyCODONE    5 mG/acetaminophen 325 mG 1 Tablet(s) Oral every 4 hours PRN Moderate Pain  oxyCODONE    5 mG/acetaminophen 325 mG 2 Tablet(s) Oral every 6 hours PRN Severe Pain      Daily     Daily Weight in k.9 (2018 16:00)    Drug Dosing Weight  Height (cm): 148.59 (2018 06:42)  Weight (kg): 62.6 (2018 06:42)  BMI (kg/m2): 28.4 (2018 06:42)  BSA (m2): 1.57 (2018 06:42)    PAST MEDICAL & SURGICAL HISTORY:  Afib  S/P carotid endarterectomy  S/P CABG x 2: and AVR  Smoking history  Vaginal lesion  PVD (peripheral vascular disease) with claudication  Herniated lumbar disc without myelopathy  COPD (chronic obstructive pulmonary disease): mild  Dyslipidemia  Bladder cancer: s/o [id number]  Colon cancer: , s/p chemoRX  Hypertension: x 25  years  S/P carotid endarterectomy  S/P CABG (coronary artery bypass graft):  and AVR  History of : x 3  H/O cystoscopy: h/o TURBT x 2--2015,   H/O hemicolectomy: h/o right hemicolectomy, right salpingo-ooporectomy, partial cystectomy-2014      FAMILY HISTORY:  Family history of asthma (Mother)      REVIEW OF SYSTEMS:    CONSTITUTIONAL: No fever, weight loss, or fatigue  EYES: No eye pain, visual disturbances, or discharge  ENMT:  No difficulty hearing, tinnitus, vertigo; No sinus or throat pain  NECK: No pain or stiffness  BREASTS: No pain, masses, or nipple discharge  RESPIRATORY: No cough, wheezing, chills or hemoptysis; + shortness of breath  CARDIOVASCULAR: + chest pain, no palpitations, dizziness, or leg swelling  GASTROINTESTINAL: No abdominal or epigastric pain. No nausea, vomiting, or hematemesis; No diarrhea or constipation. No melena or hematochezia.  GENITOURINARY: No dysuria, frequency, hematuria, or incontinence  NEUROLOGICAL: No headaches, memory loss, loss of strength, numbness, or tremors  SKIN: No itching, burning, rashes, or lesions         ICU Vital Signs Last 24 Hrs  T(C): 37.4 (2018 16:00), Max: 37.6 (2018 03:50)  T(F): 99.3 (2018 16:00), Max: 99.6 (2018 03:50)  HR: 88 (2018 16:00) (67 - 88)  BP: 142/50 (2018 16:00) (105/68 - 143/53)  BP(mean): 77 (2018 16:00) (77 - 77)  ABP: --  ABP(mean): --  RR: 29 (2018 16:00) (15 - 29)  SpO2: 100% (2018 16:00) (84% - 100%)      ABG - ( 2018 16:37 )  pH: x     /  pCO2: 49    /  pO2: 51    / HCO3: 23    / Base Excess: -3.2  /  SaO2: 83        I&O's Detail    2018 07:01  -  2018 07:00  --------------------------------------------------------  IN:    lactated ringers.: 500 mL    Oral Fluid: 320 mL    Solution: 100 mL  Total IN: 920 mL    OUT:    Bulb: 382.5 mL    Indwelling Catheter - Urethral: 750 mL    Voided: 300 mL  Total OUT: 1432.5 mL    Total NET: -512.5 mL      2018 07:01  -  2018 16:43  --------------------------------------------------------  IN:    Oral Fluid: 240 mL  Total IN: 240 mL    OUT:    Bulb: 80 mL  Total OUT: 80 mL    Total NET: 160 mL    PHYSICAL EXAM:    GENERAL: increased work of breathing   HEAD:  Atraumatic, Normocephalic  EYES: EOMI, PERRLA, conjunctiva and sclera clear  ENMT: No tonsillar erythema, exudates, or enlargement  NECK: Supple, No JVD, Normal thyroid  NERVOUS SYSTEM:  Alert & Oriented X3, Good concentration  CHEST/LUNG: +breath sounds bilaterally; No rales, rhonchi, + wheezing  HEART: Regular rate and rhythm; No murmurs, rubs, or gallops  ABDOMEN: Soft, Nontender, Nondistended; Bowel sounds present  EXTREMITIES:  2+ Peripheral Pulses, No clubbing, cyanosis, or edema    LABS:  CBC Full  -  ( 2018 03:53 )  WBC Count : 11.76 K/uL  Hemoglobin : 12.0 g/dL  Hematocrit : 36.0 %  Platelet Count - Automated : 138 K/uL  Mean Cell Volume : 89.3 fl  Mean Cell Hemoglobin : 29.8 pg  Mean Cell Hemoglobin Concentration : 33.3 gm/dL  Auto Neutrophil # : 9.56 K/uL  Auto Lymphocyte # : 0.98 K/uL  Auto Monocyte # : 0.91 K/uL  Auto Eosinophil # : 0.20 K/uL  Auto Basophil # : 0.05 K/uL  Auto Neutrophil % : 81.4 %  Auto Lymphocyte % : 8.3 %  Auto Monocyte % : 7.7 %  Auto Eosinophil % : 1.7 %  Auto Basophil % : 0.4 %    -08    140  |  110<H>  |  24<H>  ----------------------------<  114<H>  4.7   |  23  |  1.29    Ca    8.3<L>      2018 03:53  Phos  3.1     02-08  Mg     1.7     -08    TPro  5.9<L>  /  Alb  2.9<L>  /  TBili  0.4  /  DBili  x   /  AST  30  /  ALT  20  /  AlkPhos  75  02-08    CAPILLARY BLOOD GLUCOSE      POCT Blood Glucose.: 119 mg/dL (2018 03:25)    PT/INR - ( 2018 12:59 )   PT: 11.2 sec;   INR: 1.03 ratio         PTT - ( 2018 12:59 )  PTT:28.2 sec    CARDIAC MARKERS ( 2018 15:51 )  1.870 ng/mL / x     / 337 U/L / x     / x      CARDIAC MARKERS ( 2018 12:59 )  2.070 ng/mL / x     / x     / x     / x      CARDIAC MARKERS ( 2018 03:53 )  1.550 ng/mL / x     / 407 U/L / x     / 14.2 ng/mL    RADIOLOGY:  EXAM:  CT ANGIO CHEST (W)AW IC                            PROCEDURE DATE:  2018          INTERPRETATION:  CT angiogram of the chest. Patient is desaturating.   Patient is 2 days status post spinal surgery.    Patient received 80 cc of Omnipaque 350 IV by power injector. 20 cc was   discarded.    Pulmonary embolism protocol was utilized. Axial MIP imaging was included   with the images.    Sternotomy is noted. There is a slight sternal dehiscence which is likely   chronic.    Heart size iswithin normal limits. There are coronary calcifications.   There is a prosthetic aortic valve. The ascending aorta is 3 cm in   diameter in the normal range.    There is no emboli out to the subsegmental level.    There are some prominent central mediastinal lymph nodes. Retroaortic   lymph node measures 14 mm in the short axis. Lymph node inside the left   pulmonary artery measures 14 mm in the short axis. Mild subcarinal   adenopathy is difficult to delineate from adjacent structures and there   is bilateral symmetric borderline hilar adenopathy. These nodes could   well be reactive.    There is an atelectatic area at the right base posteriorly with   associated mild pleural reaction. Similar findings are seen at the left   base.    There is slight atelectasis in the inferior lingula.    Slight mucoid material is seen in the trachea.    There is a mild anterior compression of T11 likely old.    Operative clips are seen around the lower esophagus.    IMPRESSION: No evidence of emboli. There are bilateral atelectatic   infiltrates in the lower lobes with adjacent mild effusions and some   lingular area atelectasis as well.    There are prominent central mediastinal and hilar lymph nodes which could   well be reactive.    There is somemucoid material in the trachea.    Clips in the lower esophageal area. Mild compression T11 likely old.    Sternotomy and prosthetic aortic valve. Incidental findings as above.                JAYSON ACOSTA M.D., ATTENDING RADIOLOGIST  This document hasbeen electronically signed. 2018  3:06PM            CRITICAL CARE TIME SPENT:

## 2018-02-08 NOTE — PROGRESS NOTE ADULT - SUBJECTIVE AND OBJECTIVE BOX
Patient is a 78y old  Female who presents with a chief complaint of back pain    INTERVAL HPI/OVERNIGHT EVENTS:  pt. seen earlier today, subsequent events noted, pt. now in CCU on bipap  when sen this am had mild sob but was desating if taken off oxygen, denied chest pain at that time, denied back pain, states she has "reaction to anesthesia where she gets sob and has happened before"  pain in b/l thighs last night but resolved now  MEDICATIONS  (STANDING):  amLODIPine   Tablet 10 milliGRAM(s) Oral daily  aspirin enteric coated 325 milliGRAM(s) Oral daily  atorvastatin 20 milliGRAM(s) Oral <User Schedule>  ceFAZolin   IVPB 2000 milliGRAM(s) IV Intermittent every 8 hours  ceFAZolin   IVPB      chlorhexidine 0.12% Liquid 15 milliLiter(s) Swish and Spit two times a day  docusate sodium 100 milliGRAM(s) Oral three times a day  doxazosin 8 milliGRAM(s) Oral at bedtime  losartan 100 milliGRAM(s) Oral daily  metoprolol     tartrate 50 milliGRAM(s) Oral two times a day  senna 2 Tablet(s) Oral at bedtime    MEDICATIONS  (PRN):  aluminum hydroxide/magnesium hydroxide/simethicone Suspension 30 milliLiter(s) Oral every 12 hours PRN Indigestion  diphenhydrAMINE   Injectable 12.5 milliGRAM(s) IV Push every 4 hours PRN Itching  oxyCODONE    5 mG/acetaminophen 325 mG 1 Tablet(s) Oral every 4 hours PRN Moderate Pain  oxyCODONE    5 mG/acetaminophen 325 mG 2 Tablet(s) Oral every 6 hours PRN Severe Pain      Allergies    No Known Allergies    Intolerances        REVIEW OF SYSTEMS:  CONSTITUTIONAL: No fever, weight loss, or fatigue  EYES: No eye pain, visual disturbances, or discharge  ENMT:  No difficulty hearing, tinnitus, vertigo; No sinus or throat pain  NECK: No pain or stiffness  RESPIRATORY: mild sob this am   CARDIOVASCULAR: No chest pain, palpitations, dizziness, or leg swelling  GASTROINTESTINAL: No abdominal or epigastric pain. No nausea, vomiting, or hematemesis; No diarrhea or constipation. No melena or hematochezia.  GENITOURINARY: No dysuria, frequency, hematuria, or incontinence  NEUROLOGICAL: No headaches, memory loss, loss of strength, numbness, or tremors  SKIN: No itching, burning, rashes, or lesions   MUSCULOSKELETAL: No joint pain or swelling; No muscle, back, b/l thigh pain last night but resolved      Vital Signs Last 24 Hrs  T(C): 37.4 (08 Feb 2018 16:00), Max: 37.6 (08 Feb 2018 03:50)  T(F): 99.3 (08 Feb 2018 16:00), Max: 99.6 (08 Feb 2018 03:50)  HR: 79 (08 Feb 2018 16:49) (67 - 88)  BP: 142/50 (08 Feb 2018 16:00) (105/68 - 143/53)  BP(mean): 77 (08 Feb 2018 16:00) (77 - 77)  RR: 29 (08 Feb 2018 16:00) (15 - 29)  SpO2: 100% (08 Feb 2018 16:49) (84% - 100%)    PHYSICAL EXAM:  GENERAL: NAD, well-groomed, well-developed  HEAD:  Atraumatic, Normocephalic  EYES: EOMI, PERRLA, conjunctiva and sclera clear  ENMT: No tonsillar erythema, exudates, or enlargement; Moist mucous membrane  NECK: Supple, No JVD, Normal thyroid  NERVOUS SYSTEM:  Alert & Oriented X3, Good concentration; Motor Strength 5/5 B/L upper and lower extremities; DTRs 2+ intact and symmetric  CHEST/LUNG: Clear to percussion bilaterally; No rales, rhonchi, wheezing, or rubs  HEART: Regular rate and rhythm;   ABDOMEN: Soft, Nontender, Nondistended; Bowel sounds present  EXTREMITIES:  2+ Peripheral Pulses, No clubbing, cyanosis, or edema  SKIN: No rashes or lesions    LABS:                        12.0   11.76 )-----------( 138      ( 08 Feb 2018 03:53 )             36.0     02-08    140  |  110<H>  |  24<H>  ----------------------------<  114<H>  4.7   |  23  |  1.29    Ca    8.3<L>      08 Feb 2018 03:53  Phos  3.1     02-08  Mg     1.7     02-08    TPro  5.9<L>  /  Alb  2.9<L>  /  TBili  0.4  /  DBili  x   /  AST  30  /  ALT  20  /  AlkPhos  75  02-08    PT/INR - ( 08 Feb 2018 12:59 )   PT: 11.2 sec;   INR: 1.03 ratio         PTT - ( 08 Feb 2018 12:59 )  PTT:28.2 sec    CAPILLARY BLOOD GLUCOSE      POCT Blood Glucose.: 119 mg/dL (08 Feb 2018 03:25)      RADIOLOGY & ADDITIONAL TESTS:    Imaging Personally Reviewed:  [ x] YES  [ ] NO    Consultant(s) Notes Reviewed:  [x ] YES  [ ] NO    Care Discussed with Consultants/Other Providers [ x] YES  [ ] NO Patient is a 78y old  Female who presents with a chief complaint of back pain    INTERVAL HPI/OVERNIGHT EVENTS:  overnight event noted, pt. with RRt for sob  pt. seen earlier today, subsequent events RRT noted, pt. now in CCU on bipap  when seen this am had mild sob but was desating if taken off oxygen, denied chest pain at that time, denied back pain, states she has "reaction to anesthesia where she gets sob and has happened before"  pain in b/l thighs last night but resolved now  MEDICATIONS  (STANDING):  amLODIPine   Tablet 10 milliGRAM(s) Oral daily  aspirin enteric coated 325 milliGRAM(s) Oral daily  atorvastatin 20 milliGRAM(s) Oral <User Schedule>  ceFAZolin   IVPB 2000 milliGRAM(s) IV Intermittent every 8 hours  ceFAZolin   IVPB      chlorhexidine 0.12% Liquid 15 milliLiter(s) Swish and Spit two times a day  docusate sodium 100 milliGRAM(s) Oral three times a day  doxazosin 8 milliGRAM(s) Oral at bedtime  losartan 100 milliGRAM(s) Oral daily  metoprolol     tartrate 50 milliGRAM(s) Oral two times a day  senna 2 Tablet(s) Oral at bedtime    MEDICATIONS  (PRN):  aluminum hydroxide/magnesium hydroxide/simethicone Suspension 30 milliLiter(s) Oral every 12 hours PRN Indigestion  diphenhydrAMINE   Injectable 12.5 milliGRAM(s) IV Push every 4 hours PRN Itching  oxyCODONE    5 mG/acetaminophen 325 mG 1 Tablet(s) Oral every 4 hours PRN Moderate Pain  oxyCODONE    5 mG/acetaminophen 325 mG 2 Tablet(s) Oral every 6 hours PRN Severe Pain      Allergies    No Known Allergies    Intolerances        REVIEW OF SYSTEMS:  CONSTITUTIONAL: No fever, weight loss, or fatigue  EYES: No eye pain, visual disturbances, or discharge  ENMT:  No difficulty hearing, tinnitus, vertigo; No sinus or throat pain  NECK: No pain or stiffness  RESPIRATORY: mild sob this am   CARDIOVASCULAR: No chest pain, palpitations, dizziness, or leg swelling  GASTROINTESTINAL: No abdominal or epigastric pain. No nausea, vomiting, or hematemesis; No diarrhea or constipation. No melena or hematochezia.  GENITOURINARY: No dysuria, frequency, hematuria, or incontinence  NEUROLOGICAL: No headaches, memory loss, loss of strength, numbness, or tremors  SKIN: No itching, burning, rashes, or lesions   MUSCULOSKELETAL: No joint pain or swelling; No muscle, back, b/l thigh pain last night but resolved      Vital Signs Last 24 Hrs  T(C): 37.4 (08 Feb 2018 16:00), Max: 37.6 (08 Feb 2018 03:50)  T(F): 99.3 (08 Feb 2018 16:00), Max: 99.6 (08 Feb 2018 03:50)  HR: 79 (08 Feb 2018 16:49) (67 - 88)  BP: 142/50 (08 Feb 2018 16:00) (105/68 - 143/53)  BP(mean): 77 (08 Feb 2018 16:00) (77 - 77)  RR: 29 (08 Feb 2018 16:00) (15 - 29)  SpO2: 100% (08 Feb 2018 16:49) (84% - 100%)    PHYSICAL EXAM:  GENERAL: NAD, well-groomed, well-developed  HEAD:  Atraumatic, Normocephalic  EYES: EOMI, PERRLA, conjunctiva and sclera clear  ENMT: No tonsillar erythema, exudates, or enlargement; Moist mucous membrane  NECK: Supple, No JVD, Normal thyroid  NERVOUS SYSTEM:  Alert & Oriented X3, Good concentration; Motor Strength 5/5 B/L upper and lower extremities; DTRs 2+ intact and symmetric  CHEST/LUNG: Clear to percussion bilaterally; No rales, rhonchi, wheezing, or rubs  HEART: Regular rate and rhythm;   ABDOMEN: Soft, Nontender, Nondistended; Bowel sounds present  EXTREMITIES:  2+ Peripheral Pulses, No clubbing, cyanosis, or edema  SKIN: No rashes or lesions    LABS:                        12.0   11.76 )-----------( 138      ( 08 Feb 2018 03:53 )             36.0     02-08    140  |  110<H>  |  24<H>  ----------------------------<  114<H>  4.7   |  23  |  1.29    Ca    8.3<L>      08 Feb 2018 03:53  Phos  3.1     02-08  Mg     1.7     02-08    TPro  5.9<L>  /  Alb  2.9<L>  /  TBili  0.4  /  DBili  x   /  AST  30  /  ALT  20  /  AlkPhos  75  02-08    PT/INR - ( 08 Feb 2018 12:59 )   PT: 11.2 sec;   INR: 1.03 ratio         PTT - ( 08 Feb 2018 12:59 )  PTT:28.2 sec    CAPILLARY BLOOD GLUCOSE      POCT Blood Glucose.: 119 mg/dL (08 Feb 2018 03:25)      RADIOLOGY & ADDITIONAL TESTS:    Imaging Personally Reviewed:  [ x] YES  [ ] NO    Consultant(s) Notes Reviewed:  [x ] YES  [ ] NO    Care Discussed with Consultants/Other Providers [ x] YES  [ ] NO

## 2018-02-09 LAB
ALBUMIN SERPL ELPH-MCNC: 2.5 G/DL — LOW (ref 3.3–5)
ALP SERPL-CCNC: 81 U/L — SIGNIFICANT CHANGE UP (ref 40–120)
ALT FLD-CCNC: 13 U/L — SIGNIFICANT CHANGE UP (ref 12–78)
ANION GAP SERPL CALC-SCNC: 10 MMOL/L — SIGNIFICANT CHANGE UP (ref 5–17)
AST SERPL-CCNC: 25 U/L — SIGNIFICANT CHANGE UP (ref 15–37)
BASE EXCESS BLDA CALC-SCNC: 0.1 MMOL/L — SIGNIFICANT CHANGE UP (ref -2–2)
BASOPHILS # BLD AUTO: 0.01 K/UL — SIGNIFICANT CHANGE UP (ref 0–0.2)
BASOPHILS NFR BLD AUTO: 0.1 % — SIGNIFICANT CHANGE UP (ref 0–2)
BILIRUB SERPL-MCNC: 0.4 MG/DL — SIGNIFICANT CHANGE UP (ref 0.2–1.2)
BLOOD GAS COMMENTS: SIGNIFICANT CHANGE UP
BLOOD GAS SOURCE: SIGNIFICANT CHANGE UP
BUN SERPL-MCNC: 28 MG/DL — HIGH (ref 7–23)
CALCIUM SERPL-MCNC: 8.5 MG/DL — SIGNIFICANT CHANGE UP (ref 8.5–10.1)
CHLORIDE SERPL-SCNC: 108 MMOL/L — SIGNIFICANT CHANGE UP (ref 96–108)
CK SERPL-CCNC: 206 U/L — HIGH (ref 26–192)
CO2 SERPL-SCNC: 23 MMOL/L — SIGNIFICANT CHANGE UP (ref 22–31)
CREAT SERPL-MCNC: 1.24 MG/DL — SIGNIFICANT CHANGE UP (ref 0.5–1.3)
EOSINOPHIL # BLD AUTO: 0 K/UL — SIGNIFICANT CHANGE UP (ref 0–0.5)
EOSINOPHIL NFR BLD AUTO: 0 % — SIGNIFICANT CHANGE UP (ref 0–6)
GLUCOSE SERPL-MCNC: 123 MG/DL — HIGH (ref 70–99)
HCO3 BLDA-SCNC: 24 MMOL/L — SIGNIFICANT CHANGE UP (ref 21–29)
HCT VFR BLD CALC: 30.6 % — LOW (ref 34.5–45)
HGB BLD-MCNC: 10.3 G/DL — LOW (ref 11.5–15.5)
HOROWITZ INDEX BLDA+IHG-RTO: 50 — SIGNIFICANT CHANGE UP
IMM GRANULOCYTES NFR BLD AUTO: 0.6 % — SIGNIFICANT CHANGE UP (ref 0–1.5)
INR BLD: 1.05 RATIO — SIGNIFICANT CHANGE UP (ref 0.88–1.16)
LYMPHOCYTES # BLD AUTO: 0.52 K/UL — LOW (ref 1–3.3)
LYMPHOCYTES # BLD AUTO: 5.1 % — LOW (ref 13–44)
MAGNESIUM SERPL-MCNC: 1.7 MG/DL — SIGNIFICANT CHANGE UP (ref 1.6–2.6)
MCHC RBC-ENTMCNC: 29.8 PG — SIGNIFICANT CHANGE UP (ref 27–34)
MCHC RBC-ENTMCNC: 33.7 GM/DL — SIGNIFICANT CHANGE UP (ref 32–36)
MCV RBC AUTO: 88.4 FL — SIGNIFICANT CHANGE UP (ref 80–100)
MONOCYTES # BLD AUTO: 0.52 K/UL — SIGNIFICANT CHANGE UP (ref 0–0.9)
MONOCYTES NFR BLD AUTO: 5.1 % — SIGNIFICANT CHANGE UP (ref 2–14)
NEUTROPHILS # BLD AUTO: 9.06 K/UL — HIGH (ref 1.8–7.4)
NEUTROPHILS NFR BLD AUTO: 89.1 % — HIGH (ref 43–77)
NRBC # BLD: 0 /100 WBCS — SIGNIFICANT CHANGE UP (ref 0–0)
PCO2 BLDA: 40 MMHG — SIGNIFICANT CHANGE UP (ref 32–46)
PH BLD: 7.4 — SIGNIFICANT CHANGE UP (ref 7.35–7.45)
PHOSPHATE SERPL-MCNC: 2.9 MG/DL — SIGNIFICANT CHANGE UP (ref 2.5–4.5)
PLATELET # BLD AUTO: 142 K/UL — LOW (ref 150–400)
PO2 BLDA: 103 MMHG — SIGNIFICANT CHANGE UP (ref 74–108)
POTASSIUM SERPL-MCNC: 4.5 MMOL/L — SIGNIFICANT CHANGE UP (ref 3.5–5.3)
POTASSIUM SERPL-SCNC: 4.5 MMOL/L — SIGNIFICANT CHANGE UP (ref 3.5–5.3)
PROCALCITONIN SERPL-MCNC: 0.19 NG/ML — HIGH (ref 0–0.04)
PROT SERPL-MCNC: 6 GM/DL — SIGNIFICANT CHANGE UP (ref 6–8.3)
PROTHROM AB SERPL-ACNC: 11.5 SEC — SIGNIFICANT CHANGE UP (ref 9.8–12.7)
RBC # BLD: 3.46 M/UL — LOW (ref 3.8–5.2)
RBC # FLD: 14.7 % — HIGH (ref 10.3–14.5)
SAO2 % BLDA: 97 % — HIGH (ref 92–96)
SODIUM SERPL-SCNC: 141 MMOL/L — SIGNIFICANT CHANGE UP (ref 135–145)
TROPONIN I SERPL-MCNC: 1.56 NG/ML — HIGH (ref 0.01–0.04)
WBC # BLD: 10.17 K/UL — SIGNIFICANT CHANGE UP (ref 3.8–10.5)
WBC # FLD AUTO: 10.17 K/UL — SIGNIFICANT CHANGE UP (ref 3.8–10.5)

## 2018-02-09 PROCEDURE — 99232 SBSQ HOSP IP/OBS MODERATE 35: CPT

## 2018-02-09 RX ORDER — IPRATROPIUM/ALBUTEROL SULFATE 18-103MCG
3 AEROSOL WITH ADAPTER (GRAM) INHALATION EVERY 6 HOURS
Qty: 0 | Refills: 0 | Status: DISCONTINUED | OUTPATIENT
Start: 2018-02-09 | End: 2018-02-10

## 2018-02-09 RX ADMIN — Medication 100 MILLIGRAM(S): at 21:11

## 2018-02-09 RX ADMIN — Medication 50 MILLIGRAM(S): at 06:23

## 2018-02-09 RX ADMIN — Medication 50 MILLIGRAM(S): at 17:13

## 2018-02-09 RX ADMIN — ATORVASTATIN CALCIUM 20 MILLIGRAM(S): 80 TABLET, FILM COATED ORAL at 21:11

## 2018-02-09 RX ADMIN — Medication 325 MILLIGRAM(S): at 12:41

## 2018-02-09 RX ADMIN — Medication 100 MILLIGRAM(S): at 01:05

## 2018-02-09 RX ADMIN — Medication 8 MILLIGRAM(S): at 21:11

## 2018-02-09 RX ADMIN — LOSARTAN POTASSIUM 100 MILLIGRAM(S): 100 TABLET, FILM COATED ORAL at 06:22

## 2018-02-09 RX ADMIN — Medication 100 MILLIGRAM(S): at 17:13

## 2018-02-09 RX ADMIN — AMLODIPINE BESYLATE 10 MILLIGRAM(S): 2.5 TABLET ORAL at 06:22

## 2018-02-09 RX ADMIN — Medication 100 MILLIGRAM(S): at 09:30

## 2018-02-09 RX ADMIN — Medication 3 MILLILITER(S): at 05:01

## 2018-02-09 NOTE — PROGRESS NOTE ADULT - SUBJECTIVE AND OBJECTIVE BOX
HPI:  78 year old female PMH of HTN, HLD, CABG ~2yrs ago and possible porcine aortic valve replacement; admitted for elective lumbar spinal laminectomy and spinal fusion. Had pre-op chemical stress test 2 months prior to operation and was reported to patient as normal.     RRT called for SOB and O2 desaturating to 84% on NC. Patient c/o SOB that started while patient was resting in bed. Denied chest pain, dizziness.  CXR revealed vasc congestion; Lasix was given and she improved.  Feeling well currently; back to baseline.  troponin 1.5 -> 2.0 and down to 1.5  No sob/cp now. Ambulating with care. feeling better.  Stable vitals.    REVIEW OF SYSTEMS:  CONSTITUTIONAL: No fever, weight loss, or fatigue  EYES: No eye pain, visual disturbances, or discharge  ENMT:  No difficulty hearing, tinnitus, vertigo; No sinus or throat pain  NECK: No pain or stiffness  BREASTS: No pain, masses, or nipple discharge  RESPIRATORY: No cough, wheezing, chills or hemoptysis; No shortness of breath  CARDIOVASCULAR: No chest pain, palpitations, dizziness, or leg swelling  GASTROINTESTINAL: No abdominal or epigastric pain. No nausea, vomiting, or hematemesis; No diarrhea or constipation. No melena or hematochezia.  GENITOURINARY: No dysuria, frequency, hematuria, or incontinence  NEUROLOGICAL: No headaches, memory loss, loss of strength, numbness, or tremors  SKIN: No itching, burning, rashes, or lesions   LYMPH NODES: No enlarged glands  ENDOCRINE: No heat or cold intolerance; No hair loss  MUSCULOSKELETAL: No joint pain or swelling; No muscle, back, or extremity pain  PSYCHIATRIC: No depression, anxiety, mood swings, or difficulty sleeping  HEME/LYMPH: No easy bruising, or bleeding gums  ALLERGY AND IMMUNOLOGIC: No hives or eczema    PHYSICAL EXAMINATION:  -----------------------------  Vital Signs Last 24 Hrs  T(C): 36.8 (09 Feb 2018 05:39), Max: 37.4 (08 Feb 2018 16:00)  T(F): 98.2 (09 Feb 2018 05:39), Max: 99.3 (08 Feb 2018 16:00)  HR: 86 (09 Feb 2018 12:00) (59 - 103)  BP: 147/54 (09 Feb 2018 12:00) (85/76 - 178/64)  BP(mean): 74 (09 Feb 2018 11:40) (55 - 93)  RR: 22 (09 Feb 2018 12:00) (14 - 31)  SpO2: 95% (09 Feb 2018 12:00) (85% - 100%)    Constitutional: well developed, normal appearance, well groomed, well nourished, no deformities and no acute distress.   Eyes: the conjunctiva exhibited no abnormalities and the eyelids demonstrated no xanthelasmas.   HEENT: normal oral mucosa, no oral pallor and no oral cyanosis.   Neck: normal jugular venous A waves present, normal jugular venous V waves present and no jugular venous cordon A waves.   Pulmonary: no respiratory distress, normal respiratory rhythm and effort, no accessory muscle use and lungs were clear to auscultation bilaterally.   Cardiovascular: heart rate and rhythm were normal, normal S1 and S2 and no murmur, gallop, rub, heave or thrill are present.   Abdomen: soft, non-tender, no hepato-splenomegaly and no abdominal mass palpated.   Musculoskeletal: the gait could not be assessed.   Extremities: no clubbing of the fingernails, no localized cyanosis, no petechial hemorrhages and no ischemic changes.   Skin: normal skin color and pigmentation, no rash, no venous stasis, no skin lesions, no skin ulcer and no xanthoma was observed.   Psychiatric: oriented to person, place, and time, the affect was normal, the mood was normal and not feeling anxious.     LABS:   --------                          10.3   10.17 )-----------( 142      ( 09 Feb 2018 04:40 )             30.6       02-09    141  |  108  |  28<H>  ----------------------------<  123<H>  4.5   |  23  |  1.24    Ca    8.5      09 Feb 2018 04:40  Phos  2.9     02-09  Mg     1.7     02-09    TPro  6.0  /  Alb  2.5<L>  /  TBili  0.4  /  DBili  .11  /  AST  25  /  ALT  13  /  AlkPhos  81  02-09      < from: TTE Echo Doppler w/o Cont (02.08.18 @ 19:35) >  Summary:   1. Left ventricular ejection fraction, by visual estimation, is 55 to   60%.   2. Normal global left ventricular systolic function.   3. Mild mitral annular calcification.   4. Moderate mitral valve regurgitation.   5. Thickening and calcification of the anterior and posterior mitral   valve leaflets.   6. Degenerative tricuspid valve.   7. Moderate-severe tricuspid regurgitation.   8. Bioprosthesis in the aortic position.   9. Estimated pulmonary artery systolic pressure is 56.6 mmHg assuming a   right atrial pressure of 5 mmHg, which is consistent with moderate   pulmonary hypertension.    < end of copied text >

## 2018-02-09 NOTE — PROGRESS NOTE ADULT - SUBJECTIVE AND OBJECTIVE BOX
Ortho note:  Pt seen and examined at bedside resting comfortably on BiPAP. Post-op pain improving. SOB improved from yesterday. Pt denies nausea and vomiting. Voiding well. Pt denies chest pain, SOB, dizziness, fever, chills.       Vital Signs Last 24 Hrs  T(C): 36.8 (09 Feb 2018 00:01), Max: 37.4 (08 Feb 2018 16:00)  T(F): 98.3 (09 Feb 2018 00:01), Max: 99.3 (08 Feb 2018 16:00)  HR: 91 (09 Feb 2018 01:36) (67 - 103)  BP: 138/54 (09 Feb 2018 01:00) (85/76 - 151/63)  BP(mean): 79 (09 Feb 2018 01:00) (55 - 87)  RR: 15 (09 Feb 2018 01:00) (15 - 31)  SpO2: 100% (09 Feb 2018 01:36) (92% - 100%)      PHYSICAL EXAM:  GENERAL: NAD, BiPAP  CHEST/LUNG: Clear to ausculation, bilaterally   HEART: RRR S1S2  ABDOMEN: non distended, soft, non tender  BACK: Dressing clean/dry/intact. JR with serosanguinous output 140cc/24hrs  EXTREMITIES: +PT,DP,Radial pulses b/l. +SILT. +EHL, FHL. Normal strength. NV intact. No deficits. calf soft, non tender b/l. no edema b/l.    I&O's Detail    07 Feb 2018 07:01  -  08 Feb 2018 07:00  --------------------------------------------------------  IN:    lactated ringers.: 500 mL    Oral Fluid: 320 mL    Solution: 100 mL  Total IN: 920 mL    OUT:    Bulb: 382.5 mL    Indwelling Catheter - Urethral: 750 mL    Voided: 300 mL  Total OUT: 1432.5 mL    Total NET: -512.5 mL      08 Feb 2018 07:01  -  09 Feb 2018 04:29  --------------------------------------------------------  IN:    Oral Fluid: 240 mL    Solution: 100 mL  Total IN: 340 mL    OUT:    Bulb: 140 mL  Total OUT: 140 mL    Total NET: 200 mL          LABS:                        12.0   11.76 )-----------( 138      ( 08 Feb 2018 03:53 )             36.0     02-08    140  |  110<H>  |  24<H>  ----------------------------<  114<H>  4.7   |  23  |  1.29    Ca    8.3<L>      08 Feb 2018 03:53  Phos  3.1     02-08  Mg     1.7     02-08    TPro  5.9<L>  /  Alb  2.9<L>  /  TBili  0.4  /  DBili  x   /  AST  30  /  ALT  20  /  AlkPhos  75  02-08    PT/INR - ( 08 Feb 2018 12:59 )   PT: 11.2 sec;   INR: 1.03 ratio    PTT - ( 08 Feb 2018 12:59 )  PTT:28.2 sec    US Duplex Venous Lower Ext Complete, Bilateral (02.08.18 @ 15:18)   IMPRESSION:  No evidence of DVT.         CT Angio Chest w/ IV Cont (02.08.18 @ 14:49)   IMPRESSION: No evidence of emboli. There are bilateral atelectatic   infiltrates in the lower lobes with adjacent mild effusions and some   lingular area atelectasis as well.  There are prominent central mediastinal and hilar lymph nodes which could   well be reactive.  There is somemucoid material in the trachea.  Clips in the lower esophageal area. Mild compression T11 likely old.  Sternotomy and prosthetic aortic valve. Incidental findings as above.        Assessment: 77 y/o female s/p laminectomy and fusion L3-5. s/p 2uPRBC on 2/6. RRT x 2 on 2/8 for SOB and desaturating     Plan:  -JR care, monitor output  -wound care  -pain management, anti-emetic prn  -if heparin drip or similar AC needed for treatment of NSTEMI it may be started  -continue JR, monitor output, if on AC watch output closely and do not remove  -continue CCU, medical, cardiology, pulm care  -will d/w attending

## 2018-02-09 NOTE — PROGRESS NOTE ADULT - ASSESSMENT
78 year old female PMH of HTN, HLD, CABG ~2yrs ago and possible porcine aortic valve replacement; admitted for elective lumbar spinal laminectomy and spinal fusion. Had pre-op chemical stress test 2 months prior to operation and was reported to patient as normal.   RRT called for SOB and O2 desaturating to 84% on NC. Patient c/o SOB that started while patient was resting in bed. Denied chest pain, dizziness. CXR revealed vasc congestion; Lasix was given and she improved.  troponin 1.5 -> 2.0 and down to 1.5  No sob/cp now. Ambulating with care. Feeling better.   Stable vitals. Likely demand ischemia.      MEDICATIONS  (STANDING):  amLODIPine   Tablet 10 milliGRAM(s) Oral daily  aspirin enteric coated 325 milliGRAM(s) Oral daily  atorvastatin 20 milliGRAM(s) Oral <User Schedule>  ceFAZolin   IVPB 2000 milliGRAM(s) IV Intermittent every 8 hours   chlorhexidine 0.12% Liquid 15 milliLiter(s) Swish and Spit two times a day  docusate sodium 100 milliGRAM(s) Oral three times a day  doxazosin 8 milliGRAM(s) Oral at bedtime  losartan 100 milliGRAM(s) Oral daily  metoprolol     tartrate 50 milliGRAM(s) Oral two times a day  senna 2 Tablet(s) Oral at bedtime    Wilbur Garibay MD, [facility]

## 2018-02-09 NOTE — PROGRESS NOTE ADULT - SUBJECTIVE AND OBJECTIVE BOX
VITALS/LABS  2/9/2018 afeb 88 140/55 14 100% 67  2/9/2018 W 10.1 Hb 10.3 Plt 142  Na 141 K 4.5 CO2 23 Cr 1.2   2/9 7a BPAP 16/6/.5 740/40/103  REVIEW OF SYMPTOMS    Able to give ROS  Yes     RELIABLE No   CONSTITUTIONAL Weakness Yes  Chills No Vision changes No  ENDOCRINE No unexplained hair loss No heat or cold intolerance    ALLERGY No hives  Sore throat No   RESP Coughing blood no  Shortness of breath YES   NEURO No Headache  Confusion Pain neck No   CARDIAC No Chest pain No Palpitations   GI No Pain abdomen NO   Vomiting NO   PHYSICAL EXAM    HEENT Unremarkable PERRLA atraumatic   RESP Fair air entry EXP prolonged    Harsh breath sound Resp distres mild   CARDIAC S1 S2 No S3     NO JVD    ABDOMEN SOFT BS PRESENT NOT DISTENDED No hepatosplenomegaly PEDAL EDEMA present No calf tenderness  NO rash   GENERAL Not TOXIC looking  GLOBAL ISSUE/BEST PRACTICE:        PROBLEM: Analgesia:     na                        PROBLEM: Sedation:     na               PROBLEM: HOB elevation:   y             PROBLEM: Stress ulcer proph:    na                      PROBLEM: VTE prophylaxis:       (2/8)     No ac being used due to recent spine surgery (2/9)               PROBLEM: Glycemic control:    na   PROBLEM: Nutrition:    Reg (2/7)           PROBLEM: Advanced directive: na     PROBLEM: Allergies:  na  PATIENT SUMMARY  PROBLEM ASSESSMENT PLAN 2/6/2018 ADMISSION  [facility]       NOTE In accordance with  [facility] policy ICU final medical management decisions/MD orders are  determined/done only by ICU Intensivists         78 f former smoker Not on home nebulizer or oxygen PSH L CEA CABG 2 y ago PMH HTN, HLD, ASHD, admitted [facility] 2/6/2018 for elective lumbar spinal laminectomy and spinal fusion. Had pre-op chemical stress test 2 months prior to operation and was reported to patient as normal.   Laminectomy and fusion of lumbar spine with instrumentation done on  02/06/2018 for Spinal stenosis of lumbar region with neurogenic claudication  02/06/2018   Post op on 2/7 and 2/8 developed episodes of desaturation including a rapid response in radiology 2/8 leading to transfer to ICU where she was Rxed with BPAP CTA chest 2/8 was neg for PE Pt did have troponinemia on 2/8 and Cardio and Pulm were consulted 2/8    PROBLEMS   ACUTE HYPOXIC HYPERCAPNIC RESP FAILURE   2/9 7a BPAP 16/6/.5 740/40/103 2/9 Gas exchange acceptable   COPD EX   On  Duoneb.4 (2/8)   RESP TRACT INFECTION   2/8 Flu AB n 2/9 pct .19  2/9 W 10.1  PNEUMONIA PPX   Chlorhexidine .12% 15.2 (2/8)    VENOUS THROMBOEMBOLISM RULED OUT AS CAUSE OF HYPOXIA   2/8 CTA n 2/8 V dupl nn  ACUTE NSTEMI   2/8-2/9  [phone]    2/8-2/9 Tr 1 1.5-2-1.8-1.4-1.5   On  (2/8) Atorvastat 20 (2/6) Losartan 100 (2/6) Metoprolol 50 .2 (2/6)   2/9/2018 Cannot use FD hep bec of recent spine surgery  STATUS POST SPNE SURGERY (2/6)   MALNUTRITION (2/9 Alb 2.5)     PLAN SUGGESTIONS  2/9/2018 Suggest inhaled corticosteroids and IV steroids   2/9 Monitor ABG vbg po   2/9 Recommend DVT prophylaxis (pharmac) when cleared by Ortho    TIME SPENT Over 25 minutes aggregate care time spent on encounter; activities included   direct patient care, counseling and/or coordinating care reviewing notes, lab data/ imaging , discussion with multidisciplinary team/ patient  /family. Risks, benefits, alternatives  discussed in detail. Proper antibiotic use issues addressed Questions/concerns  were addressed  as  best as possible

## 2018-02-09 NOTE — CHART NOTE - NSCHARTNOTEFT_GEN_A_CORE
HPI    HPI:  78 year old female PMH of HTN, HLD, ASHD, Left cartoid endarterectomy, CABG, admitted for elective lumbar spinal laminectomy and spinal fusion. Pt states that she may have a hx of COPD. Pt s/p posterior spinal fusion L3 L5 laminectomy 2/6/18.  RRT called  2/8/18 while pt in US for hypoxia and SOB w/ O2 SAT 84% on 100% non rebreather. Pt shows increased work of breathing, admits to SOB, and admits to chest discomfort.   CT revealed  No evidence of emboli. There are bilateral atelectatic infiltrates in the lower lobes with adjacent mild effusions and some lingular area atelectasis as well.. Patient brought to icu, did not require intubation. placed on BiPAP. initially patient wit respiratory acidosis. improved after steroids , respiratory treatments and BiPAP.  Cardiology on case, nstemi, stable tronopin trending down. . patient seen by PT today.  Seen by pulmonary, Dr. Pink. OOB with assist. Stable for transfer to telemetry for continued care. HPI    HPI:  78 year old female PMH of HTN, HLD, ASHD, Left cartoid endarterectomy, CABG, admitted for elective lumbar spinal laminectomy and spinal fusion. Pt states that she may have a hx of COPD. Pt s/p posterior spinal fusion L3 L5 laminectomy 2/6/18.  RRT called  2/8/18 while pt in US for hypoxia and SOB w/ O2 SAT 84% on 100% non rebreather. Pt shows increased work of breathing, admits to SOB, and admits to chest discomfort.   CT revealed  No evidence of emboli. There are bilateral atelectatic infiltrates in the lower lobes with adjacent mild effusions and some lingular area atelectasis as well.. Patient brought to icu, did not require intubation. placed on BiPAP. initially patient wit respiratory acidosis. improved after steroids , respiratory treatments and BiPAP.  Cardiology on case, nstemi, stable tronopin trending down. . patient seen by PT today.  Seen by pulmonary, Dr. Pink. OOB with assist. Stable for transfer to telemetry for continued care.  Report given to surgical FLAVIA Campbell. Dr. Leal aware

## 2018-02-09 NOTE — PROGRESS NOTE ADULT - SUBJECTIVE AND OBJECTIVE BOX
# CC: Elective spinal fusion    ## HPI:  78 year old female PMH of HTN, HLD, ASHD, Left cartoid endarterectomy, CABG, admitted for elective lumbar spinal laminectomy and spinal fusion. Pt states that she may have a hx of COPD. Pt s/p posterior spinal fusion L3 L5 laminectomy 2/6/18. On 2/8, AMS with Type 2 respiratory failure.  Now doing much better.    **O/N:**  No events, tolerated NIPPV.  Does not want nebs.    ## ROS:  No complaints.  CONSTITUTIONAL: No fever, chills  EYES: No eye pain, visual disturbances  ENMT:  No difficulty hearing, No sinus or throat pain  RESPIRATORY: No cough, wheezing, hemoptysis; No shortness of breath  CARDIOVASCULAR: No chest pain, palpitations  GASTROINTESTINAL: No abdominal or epigastric pain. No nausea, vomiting, or hematemesis; No diarrhea. No melena or hematochezia.  GENITOURINARY: No dysuria, frequency, hematuria  NEUROLOGICAL: No headaches    ## Labs:  CBC:                        10.3   10.17 )-----------( 142      ( 09 Feb 2018 04:40 )             30.6     Chem: 02-09    141  |  108  |  28<H>  ----------------------------<  123<H>  4.5   |  23  |  1.24    Ca    8.5      09 Feb 2018 04:40  Phos  2.9     02-09  Mg     1.7     02-09    TPro  6.0  /  Alb  2.5<L>  /  TBili  0.4  /  DBili  .11  /  AST  25  /  ALT  13  /  AlkPhos  81  02-09    Troponin I, Serum (02.09.18 @ 04:40)    Troponin I, Serum: 1.560    Coags:  PT/INR - ( 09 Feb 2018 04:40 )   PT: 11.5 sec;   INR: 1.05 ratio    PTT - ( 08 Feb 2018 12:59 )  PTT:28.2 sec    Culture - Blood (collected 08 Feb 2018 08:38): No growth to date.  Culture - Blood (collected 08 Feb 2018 08:38): No growth to date.    ## Imaging: No new imaging    ## Medications:  ALBUTerol/ipratropium for Nebulization 3 milliLiter(s) Nebulizer every 6 hours PRN  aluminum hydroxide/magnesium hydroxide/simethicone Suspension 30 milliLiter(s) Oral every 12 hours PRN  amLODIPine   Tablet 10 milliGRAM(s) Oral daily  aspirin enteric coated 325 milliGRAM(s) Oral daily  atorvastatin 20 milliGRAM(s) Oral <User Schedule>  ceFAZolin   IVPB 2000 milliGRAM(s) IV Intermittent every 8 hours  chlorhexidine 0.12% Liquid 15 milliLiter(s) Swish and Spit two times a day  diphenhydrAMINE   Injectable 12.5 milliGRAM(s) IV Push every 4 hours PRN  docusate sodium 100 milliGRAM(s) Oral three times a day  doxazosin 8 milliGRAM(s) Oral at bedtime  losartan 100 milliGRAM(s) Oral daily  metoprolol     tartrate 50 milliGRAM(s) Oral two times a day  oxyCODONE    5 mG/acetaminophen 325 mG 1 Tablet(s) Oral every 4 hours PRN  oxyCODONE    5 mG/acetaminophen 325 mG 2 Tablet(s) Oral every 6 hours PRN  senna 2 Tablet(s) Oral at bedtime    ## O/E:  T(C): 36.6 (09 Feb 2018 15:38), Max: 36.8 (09 Feb 2018 00:01)  HR: 97 (09 Feb 2018 16:47) (59 - 103)  BP: 147/54 (09 Feb 2018 12:00) (85/76 - 178/64)  BP(mean): 74 (09 Feb 2018 11:40) (60 - 93)  RR: 22 (09 Feb 2018 12:00) (14 - 31)  SpO2: 92% (09 Feb 2018 16:47) (85% - 100%)  IN: 340 mL / OUT: 205 mL / NET: 135 mL    Gen: sitting up in bed in NAD  HEENT: PERRL  Resp: CTA B/L no c/r/w  CVS: S1S2 no m/r/g  Abd: soft NT/ND +BS  Ext: no c/c/e  Neuro: A&Ox3    ## Daily Issues  - Analgesia: N/A  - Sedation: N/A  - HOB elevation: Y  - GI ppx (ulcers): N/A  - DVT ppx: SCDs  - Nutrition: PO diet    ## Assessment / Plan:  78F with NSTEMI / respiratory distress  - NSTEMI: troponin peaked at 2.070 now downtrending x 3    * No chest pain / LH / dizziness / dyspnea    * Cardiology consult in chart, appreciated. Likely demand ischemia  - Respiratory distress    * Resolved. Recommend NIPPV QHS    * Refusing NEBs now, but not wheezing. Can keep PRN    * Asymptomatic no complaints  - DVT ppx per ortho    ## Code status:  Goals of care discussion: [x] yes [ ] no  [x] full code  [ ] DNR  [ ] DNI  [ ] MOLST    Transfer to general medicine.

## 2018-02-10 ENCOUNTER — TRANSCRIPTION ENCOUNTER (OUTPATIENT)
Age: 78
End: 2018-02-10

## 2018-02-10 LAB
ANION GAP SERPL CALC-SCNC: 6 MMOL/L — SIGNIFICANT CHANGE UP (ref 5–17)
BILIRUB DIRECT SERPL-MCNC: 0.23 MG/DL — HIGH (ref 0.05–0.2)
BUN SERPL-MCNC: 29 MG/DL — HIGH (ref 7–23)
CALCIUM SERPL-MCNC: 8.8 MG/DL — SIGNIFICANT CHANGE UP (ref 8.5–10.1)
CHLORIDE SERPL-SCNC: 109 MMOL/L — HIGH (ref 96–108)
CO2 SERPL-SCNC: 29 MMOL/L — SIGNIFICANT CHANGE UP (ref 22–31)
CREAT SERPL-MCNC: 1.08 MG/DL — SIGNIFICANT CHANGE UP (ref 0.5–1.3)
GLUCOSE SERPL-MCNC: 104 MG/DL — HIGH (ref 70–99)
HCT VFR BLD CALC: 30.5 % — LOW (ref 34.5–45)
HGB BLD-MCNC: 10.2 G/DL — LOW (ref 11.5–15.5)
MAGNESIUM SERPL-MCNC: 1.9 MG/DL — SIGNIFICANT CHANGE UP (ref 1.6–2.6)
MCHC RBC-ENTMCNC: 29.7 PG — SIGNIFICANT CHANGE UP (ref 27–34)
MCHC RBC-ENTMCNC: 33.4 GM/DL — SIGNIFICANT CHANGE UP (ref 32–36)
MCV RBC AUTO: 88.7 FL — SIGNIFICANT CHANGE UP (ref 80–100)
NRBC # BLD: 0 /100 WBCS — SIGNIFICANT CHANGE UP (ref 0–0)
PHOSPHATE SERPL-MCNC: 1.8 MG/DL — LOW (ref 2.5–4.5)
PLATELET # BLD AUTO: 175 K/UL — SIGNIFICANT CHANGE UP (ref 150–400)
POTASSIUM SERPL-MCNC: 3.8 MMOL/L — SIGNIFICANT CHANGE UP (ref 3.5–5.3)
POTASSIUM SERPL-SCNC: 3.8 MMOL/L — SIGNIFICANT CHANGE UP (ref 3.5–5.3)
RBC # BLD: 3.44 M/UL — LOW (ref 3.8–5.2)
RBC # FLD: 14.5 % — SIGNIFICANT CHANGE UP (ref 10.3–14.5)
SODIUM SERPL-SCNC: 144 MMOL/L — SIGNIFICANT CHANGE UP (ref 135–145)
TROPONIN I SERPL-MCNC: 1.75 NG/ML — HIGH (ref 0.01–0.04)
WBC # BLD: 10.79 K/UL — HIGH (ref 3.8–10.5)
WBC # FLD AUTO: 10.79 K/UL — HIGH (ref 3.8–10.5)

## 2018-02-10 PROCEDURE — 99233 SBSQ HOSP IP/OBS HIGH 50: CPT

## 2018-02-10 RX ORDER — POTASSIUM PHOSPHATE, MONOBASIC POTASSIUM PHOSPHATE, DIBASIC 236; 224 MG/ML; MG/ML
15 INJECTION, SOLUTION INTRAVENOUS ONCE
Qty: 0 | Refills: 0 | Status: COMPLETED | OUTPATIENT
Start: 2018-02-10 | End: 2018-02-10

## 2018-02-10 RX ORDER — IPRATROPIUM/ALBUTEROL SULFATE 18-103MCG
3 AEROSOL WITH ADAPTER (GRAM) INHALATION THREE TIMES A DAY
Qty: 0 | Refills: 0 | Status: DISCONTINUED | OUTPATIENT
Start: 2018-02-10 | End: 2018-02-11

## 2018-02-10 RX ORDER — BUDESONIDE, MICRONIZED 100 %
0.5 POWDER (GRAM) MISCELLANEOUS
Qty: 0 | Refills: 0 | Status: DISCONTINUED | OUTPATIENT
Start: 2018-02-10 | End: 2018-02-12

## 2018-02-10 RX ORDER — IPRATROPIUM/ALBUTEROL SULFATE 18-103MCG
3 AEROSOL WITH ADAPTER (GRAM) INHALATION EVERY 4 HOURS
Qty: 0 | Refills: 0 | Status: DISCONTINUED | OUTPATIENT
Start: 2018-02-10 | End: 2018-02-11

## 2018-02-10 RX ORDER — SODIUM,POTASSIUM PHOSPHATES 278-250MG
1 POWDER IN PACKET (EA) ORAL
Qty: 0 | Refills: 0 | Status: DISCONTINUED | OUTPATIENT
Start: 2018-02-10 | End: 2018-02-12

## 2018-02-10 RX ORDER — ACETAMINOPHEN 500 MG
650 TABLET ORAL ONCE
Qty: 0 | Refills: 0 | Status: COMPLETED | OUTPATIENT
Start: 2018-02-10 | End: 2018-02-10

## 2018-02-10 RX ADMIN — Medication 100 MILLIGRAM(S): at 17:06

## 2018-02-10 RX ADMIN — OXYCODONE AND ACETAMINOPHEN 1 TABLET(S): 5; 325 TABLET ORAL at 22:08

## 2018-02-10 RX ADMIN — Medication 3 MILLILITER(S): at 22:34

## 2018-02-10 RX ADMIN — Medication 1 TABLET(S): at 22:08

## 2018-02-10 RX ADMIN — Medication 100 MILLIGRAM(S): at 10:38

## 2018-02-10 RX ADMIN — Medication 50 MILLIGRAM(S): at 05:04

## 2018-02-10 RX ADMIN — Medication 325 MILLIGRAM(S): at 11:18

## 2018-02-10 RX ADMIN — Medication 650 MILLIGRAM(S): at 06:54

## 2018-02-10 RX ADMIN — LOSARTAN POTASSIUM 100 MILLIGRAM(S): 100 TABLET, FILM COATED ORAL at 05:04

## 2018-02-10 RX ADMIN — Medication 50 MILLIGRAM(S): at 17:09

## 2018-02-10 RX ADMIN — OXYCODONE AND ACETAMINOPHEN 1 TABLET(S): 5; 325 TABLET ORAL at 12:20

## 2018-02-10 RX ADMIN — OXYCODONE AND ACETAMINOPHEN 1 TABLET(S): 5; 325 TABLET ORAL at 11:19

## 2018-02-10 RX ADMIN — Medication 8 MILLIGRAM(S): at 22:08

## 2018-02-10 RX ADMIN — AMLODIPINE BESYLATE 10 MILLIGRAM(S): 2.5 TABLET ORAL at 05:04

## 2018-02-10 RX ADMIN — Medication 100 MILLIGRAM(S): at 01:04

## 2018-02-10 NOTE — DISCHARGE NOTE ADULT - CARE PROVIDER_API CALL
Beau Leal), Orthopaedic Surgery  [address] [place] [address]  Phone: [phone]  Fax: [phone]

## 2018-02-10 NOTE — DISCHARGE NOTE ADULT - PLAN OF CARE
Post-op pain control Follow up in 10-14 days with Dr. Leal. Please call the office to make an appointment. Activity as tolerated. Rest as needed. You may eat a regular diet. Do not drive while taking narcotic pain medication. Take Colace to prevent constipation. Call for any fever over 101, nausea, vomiting, severe pain, no passing of gas or bowel movement. Leave the dressing in place. Follow up with your primary care physician and cardiologist. Follow up with your primary care physician. Follow up with your primary care physician and pulmonologist. Please follow up with Dr. Blanchard at [facility] within 1-2 weeks  Please follow up with your primary care physician Please follow up with Dr. Pink, pulmonologist, in his office.  You may call at [phone] to schedule an appointment. Follow up in 10-14 days with Dr. Leal. Please call the office to make an appointment. Activity as tolerated. Rest as needed. You may eat a regular diet. Do not drive while taking narcotic pain medication. Take Colace to prevent constipation. Call/return to ED for any fever over 101, nausea, vomiting, severe pain, no passing of gas or bowel movement, chest pain, shortness of breath, fever. Leave the dressing in place. Please follow up with Dr. Gama Blanchard at [facility] within 1-2 weeks. You may call his office at [phone] to schedule an appointment.   Please follow up with your primary care physician

## 2018-02-10 NOTE — PROGRESS NOTE ADULT - PROBLEM SELECTOR PLAN 1
s/p spinal fusion and laminectomy, post -op care as per ortho  pain management with bowel regimen, OT/PT, incentive spirometer

## 2018-02-10 NOTE — PROGRESS NOTE ADULT - SUBJECTIVE AND OBJECTIVE BOX
Patient seen and examined at bedside with Dr Leal  Pain  well controlled on current pain regimen  Denies CP, SOB, N/V/D, weakness, numbness   No new complaints     Vital Signs Last 24 Hrs  T(C): 36.3 (10 Feb 2018 16:24), Max: 37.6 (09 Feb 2018 20:10)  T(F): 97.3 (10 Feb 2018 16:24), Max: 99.6 (09 Feb 2018 20:10)  HR: 67 (10 Feb 2018 16:24) (66 - 97)  BP: 156/63 (10 Feb 2018 16:24) (140/53 - 180/71)  BP(mean): 84 (09 Feb 2018 20:00) (77 - 84)  RR: 16 (10 Feb 2018 16:24) (16 - 20)  SpO2: 95% (10 Feb 2018 16:24) (89% - 99%)  I&O's Summary    09 Feb 2018 07:01  -  10 Feb 2018 07:00  --------------------------------------------------------  IN: 625 mL / OUT: 1175 mL / NET: -550 mL    10 Feb 2018 07:01  -  10 Feb 2018 16:45  --------------------------------------------------------  IN: 100 mL / OUT: 290 mL / NET: -190 mL        PHYSICAL EXAM  General: NAD  Lungs: CTA DAVID  CV: S1, S2 no murmurs  Primary surgical dressing  changed to Aquacel, JR removed. patient tolerated well  EXT: Distal pulses (+2) bilaterally, Calves supple/nontender bilaterally  Sensation grossly intact to light touch bilaterally  EHL/FHL/GS/TA intact bilaterally    LABS                                 10.2<L>  10.79<H> )-----------( 175      ( 10 Feb 2018 07:48 )             30.5<L>  10 Feb 2018 07:48                        10.3<L>  10.17 )-----------( 142<L>    ( 09 Feb 2018 04:40 )             30.6<L>  09 Feb 2018 04:40    10 Feb 2018 07:48    144    |  109<H>  |  29<H>  ----------------------------<  104<H>  3.8     |  29     |  1.08   09 Feb 2018 04:40    141    |  108    |  28<H>  ----------------------------<  123<H>  4.5     |  23     |  1.24     Ca    8.8        10 Feb 2018 07:48  Ca    8.5        09 Feb 2018 04:40  Phos  1.8<L>     10 Feb 2018 07:48  Phos  2.9       09 Feb 2018 04:40  Mg     1.9       10 Feb 2018 07:48  Mg     1.7       09 Feb 2018 04:40    TPro  x      /  Alb  x      /  TBili  x      /  DBili  .23<H>  /  AST  x      /  ALT  x      /  AlkPhos  x      10 Feb 2018 07:48  TPro  6.0    /  Alb  2.5<L>  /  TBili  0.4    /  DBili  .11    /  AST  25     /  ALT  13     /  AlkPhos  81     09 Feb 2018 04:40      Assessment: 79 y/o female s/p laminectomy and fusion L3-5. s/p 2uPRBC on 2/6. RRT x 2 on 2/8 for SOB and desaturating, now stable  - Pain control as clinically indicated  - DVT prophylaxis: SCDs       - PT/OT: Weight bearing as tolerated, spine precautions   - Incentive spirometry  - Follow up labs  - Discussed with Dr Leal : D/C planning once stable Patient seen and examined at bedside with Dr Leal  Pain  well controlled on current pain regimen  Denies CP, SOB, N/V/D, weakness, numbness   No new complaints     Vital Signs Last 24 Hrs  T(C): 36.3 (10 Feb 2018 16:24), Max: 37.6 (09 Feb 2018 20:10)  T(F): 97.3 (10 Feb 2018 16:24), Max: 99.6 (09 Feb 2018 20:10)  HR: 67 (10 Feb 2018 16:24) (66 - 97)  BP: 156/63 (10 Feb 2018 16:24) (140/53 - 180/71)  BP(mean): 84 (09 Feb 2018 20:00) (77 - 84)  RR: 16 (10 Feb 2018 16:24) (16 - 20)  SpO2: 95% (10 Feb 2018 16:24) (89% - 99%)  I&O's Summary    09 Feb 2018 07:01  -  10 Feb 2018 07:00  --------------------------------------------------------  IN: 625 mL / OUT: 1175 mL / NET: -550 mL    10 Feb 2018 07:01  -  10 Feb 2018 16:45  --------------------------------------------------------  IN: 100 mL / OUT: 290 mL / NET: -190 mL        PHYSICAL EXAM  General: NAD  Lungs: CTA DAVID  CV: S1, S2 no murmurs  Primary surgical dressing  changed to Aquacel, JR removed. patient tolerated well  EXT: Distal pulses (+2) bilaterally, Calves supple/nontender bilaterally  Sensation grossly intact to light touch bilaterally  EHL/FHL/GS/TA intact bilaterally    LABS                                 10.2<L>  10.79<H> )-----------( 175      ( 10 Feb 2018 07:48 )             30.5<L>  10 Feb 2018 07:48                        10.3<L>  10.17 )-----------( 142<L>    ( 09 Feb 2018 04:40 )             30.6<L>  09 Feb 2018 04:40    10 Feb 2018 07:48    144    |  109<H>  |  29<H>  ----------------------------<  104<H>  3.8     |  29     |  1.08   09 Feb 2018 04:40    141    |  108    |  28<H>  ----------------------------<  123<H>  4.5     |  23     |  1.24     Ca    8.8        10 Feb 2018 07:48  Ca    8.5        09 Feb 2018 04:40  Phos  1.8<L>     10 Feb 2018 07:48  Phos  2.9       09 Feb 2018 04:40  Mg     1.9       10 Feb 2018 07:48  Mg     1.7       09 Feb 2018 04:40    TPro  x      /  Alb  x      /  TBili  x      /  DBili  .23<H>  /  AST  x      /  ALT  x      /  AlkPhos  x      10 Feb 2018 07:48  TPro  6.0    /  Alb  2.5<L>  /  TBili  0.4    /  DBili  .11    /  AST  25     /  ALT  13     /  AlkPhos  81     09 Feb 2018 04:40      Assessment: 77 y/o female s/p laminectomy and fusion L3-5. s/p 2uPRBC on 2/6. RRT x 2 on 2/8 for SOB and desaturating, now stable  - Pain control as clinically indicated  - DVT prophylaxis: SCDs       - PT/OT: Weight bearing as tolerated, spine precautions   - Incentive spirometry  - Follow up labs  - Discussed with Dr Leal : D/C planning once stable from medicine/cardio/pulm

## 2018-02-10 NOTE — PROGRESS NOTE ADULT - SUBJECTIVE AND OBJECTIVE BOX
VITALS/LABS  2/10/2018 afeb 67 150/60 16 95%   2/10/2018 W 10.7 Hb 10.2 Plt 175 Na 144 K 3.8 CO2 29 Cr 1 PO4 1.8   REVIEW OF SYMPTOMS    Able to give ROS  Yes     RELIABLE No   CONSTITUTIONAL Weakness Yes  Chills No Vision changes No  ENDOCRINE No unexplained hair loss No heat or cold intolerance    ALLERGY No hives  Sore throat No   RESP Coughing blood no  Shortness of breath YES   NEURO No Headache  Confusion Pain neck No   CARDIAC No Chest pain No Palpitations   GI No Pain abdomen NO   Vomiting NO   PHYSICAL EXAM    HEENT Unremarkable PERRLA atraumatic   RESP Fair air entry EXP prolonged    Harsh breath sound Resp distres mild   CARDIAC S1 S2 No S3     NO JVD    ABDOMEN SOFT BS PRESENT NOT DISTENDED No hepatosplenomegaly PEDAL EDEMA present No calf tenderness  NO rash   GENERAL Not TOXIC looking  GLOBAL ISSUE/BEST PRACTICE:        PROBLEM: Analgesia:     na                        PROBLEM: Sedation:     na               PROBLEM: HOB elevation:   y             PROBLEM: Stress ulcer proph:    na                      PROBLEM: VTE prophylaxis:       (2/8)     No ac being used due to recent spine surgery (2/9)               PROBLEM: Glycemic control:    na   PROBLEM: Nutrition:    Reg (2/7)           PROBLEM: Advanced directive: na     PROBLEM: Allergies:  na  PATIENT SUMMARY  PROBLEM ASSESSMENT PLAN 2/6/2018 ADMISSION  [facility]     2/10 PT TRANSFERRED TO       78 f former smoker Not on home nebulizer or oxygen PSH L CEA CABG porcine AV  2 y ago PMH HTN, HLD, ASHD, admitted [facility]S 2/6/2018 for elective lumbar spinal laminectomy and spinal fusion. Had pre-op chemical stress test 2 months prior to operation and was reported to patient as normal.   Laminectomy and fusion of lumbar spine with instrumentation done on  02/06/2018 for Spinal stenosis of lumbar region with neurogenic claudication  02/06/2018   Post op on 2/7 and 2/8 developed episodes of desaturation including a rapid response in radiology 2/8 leading to transfer to ICU where she was Rxed with BPAP CTA chest 2/8 was neg for PE Pt did have troponinemia on 2/8 and Cardio and Pulm were consulted 2/8    PROBLEMS   ACUTE HYPOXIC HYPERCAPNIC RESP FAILURE   2/9 7a BPAP 16/6/.5 740/40/103 2/9 Gas exchange acceptable   COPD EX   On  Duoneb.4 (2/8) Pulmicort (2/10) Solumed 40 (2/10)  RESP TRACT INFECTION   2/8 Flu AB n 2/9 rvp n 2/9 pct .19  2/9 W 10.1 2/9 bc n 2/8 bc n   No strong evidence of active infection   PNEUMONIA PPX   Chlorhexidine .12% 15.2 (2/8)    VENOUS THROMBOEMBOLISM RULED OUT AS CAUSE OF HYPOXIA   2/8 CTA n 2/8 V dupl nn  POSSIBLE ACUTE NSTEMI   2/8-2/9  [phone]    2/8-2/9-2/10 Tr 1 1.5-2-1.8-1.4-1.5 -1.7  On  (2/8) Atorvastat 20 (2/6) Losartan 100 (2/6) Metoprolol 50 .2 (2/6)   2/9/2018 Cannot use FD hep bec of recent spine surgery  CHF  ECHO 2/8 EF 50% pasp 55 mod mr aortic valve bioprosthesis     STATUS POST SPNE SURGERY (2/6)   MALNUTRITION (2/9 Alb 2.5)     PLAN SUGGESTIONS  2/10 Dyspnea episode may have been sec combination of CHF and COPD steroids added   2/10 KPO4 replaced   2/10 ICS IVCS added  2/9/2018 Suggest inhaled corticosteroids and IV steroids   2/9 Monitor ABG vbg po   2/9 Recommend DVT prophylaxis (pharmac) when cleared by Ortho    TIME SPENT Over 25 minutes aggregate care time spent on encounter; activities included   direct patient care, counseling and/or coordinating care reviewing notes, lab data/ imaging , discussion with multidisciplinary team/ patient  /family. Risks, benefits, alternatives  discussed in detail. Proper antibiotic use issues addressed Questions/concerns  were addressed  as  best as possible

## 2018-02-10 NOTE — PROGRESS NOTE ADULT - SUBJECTIVE AND OBJECTIVE BOX
INTERVAL HPI/OVERNIGHT EVENTS:  Pt seen and examined at bedside.     Allergies/Intolerance: No Known Allergies      MEDICATIONS  (STANDING):  amLODIPine   Tablet 10 milliGRAM(s) Oral daily  aspirin enteric coated 325 milliGRAM(s) Oral daily  atorvastatin 20 milliGRAM(s) Oral <User Schedule>  ceFAZolin   IVPB 2000 milliGRAM(s) IV Intermittent every 8 hours  ceFAZolin   IVPB      docusate sodium 100 milliGRAM(s) Oral three times a day  doxazosin 8 milliGRAM(s) Oral at bedtime  losartan 100 milliGRAM(s) Oral daily  metoprolol     tartrate 50 milliGRAM(s) Oral two times a day  senna 2 Tablet(s) Oral at bedtime    MEDICATIONS  (PRN):  ALBUTerol/ipratropium for Nebulization 3 milliLiter(s) Nebulizer every 6 hours PRN Shortness of Breath and/or Wheezing  aluminum hydroxide/magnesium hydroxide/simethicone Suspension 30 milliLiter(s) Oral every 12 hours PRN Indigestion  diphenhydrAMINE   Injectable 12.5 milliGRAM(s) IV Push every 4 hours PRN Itching  oxyCODONE    5 mG/acetaminophen 325 mG 1 Tablet(s) Oral every 4 hours PRN Moderate Pain  oxyCODONE    5 mG/acetaminophen 325 mG 2 Tablet(s) Oral every 6 hours PRN Severe Pain        ROS: all systems reviewed and wnl      PHYSICAL EXAMINATION:  Vital Signs Last 24 Hrs  T(C): 36.9 (10 Feb 2018 11:50), Max: 37.6 (09 Feb 2018 20:10)  T(F): 98.4 (10 Feb 2018 11:50), Max: 99.6 (09 Feb 2018 20:10)  HR: 82 (10 Feb 2018 11:50) (66 - 97)  BP: 145/56 (10 Feb 2018 11:50) (140/53 - 180/71)  BP(mean): 84 (09 Feb 2018 20:00) (77 - 84)  RR: 16 (10 Feb 2018 11:50) (15 - 20)  SpO2: 99% (10 Feb 2018 11:50) (89% - 99%)  CAPILLARY BLOOD GLUCOSE          02-09 @ 07:01  -  02-10 @ 07:00  --------------------------------------------------------  IN: 625 mL / OUT: 1175 mL / NET: -550 mL    02-10 @ 07:01  -  02-10 @ 14:34  --------------------------------------------------------  IN: 100 mL / OUT: 290 mL / NET: -190 mL        GENERAL:   NECK: supple, No JVD  CHEST/LUNG: clear to auscultation bilaterally; no rales, rhonchi, or wheezing b/l  HEART: normal S1, S2  ABDOMEN: BS+, soft, ND, NT   EXTREMITIES:  pulses palpable; no clubbing, cyanosis, or edema b/l LEs  SKIN: no rashes or lesions      LABS:                        10.2   10.79 )-----------( 175      ( 10 Feb 2018 07:48 )             30.5     02-10    144  |  109<H>  |  29<H>  ----------------------------<  104<H>  3.8   |  29  |  1.08    Ca    8.8      10 Feb 2018 07:48  Phos  1.8     02-10  Mg     1.9     02-10    TPro  x   /  Alb  x   /  TBili  x   /  DBili  .23<H>  /  AST  x   /  ALT  x   /  AlkPhos  x   02-10    PT/INR - ( 09 Feb 2018 04:40 )   PT: 11.5 sec;   INR: 1.05 ratio INTERVAL HPI/OVERNIGHT EVENTS:  Pt seen and examined at bedside.     Allergies/Intolerance: No Known Allergies      MEDICATIONS  (STANDING):  amLODIPine   Tablet 10 milliGRAM(s) Oral daily  aspirin enteric coated 325 milliGRAM(s) Oral daily  atorvastatin 20 milliGRAM(s) Oral <User Schedule>  ceFAZolin   IVPB 2000 milliGRAM(s) IV Intermittent every 8 hours  ceFAZolin   IVPB      docusate sodium 100 milliGRAM(s) Oral three times a day  doxazosin 8 milliGRAM(s) Oral at bedtime  losartan 100 milliGRAM(s) Oral daily  metoprolol     tartrate 50 milliGRAM(s) Oral two times a day  senna 2 Tablet(s) Oral at bedtime    MEDICATIONS  (PRN):  ALBUTerol/ipratropium for Nebulization 3 milliLiter(s) Nebulizer every 6 hours PRN Shortness of Breath and/or Wheezing  aluminum hydroxide/magnesium hydroxide/simethicone Suspension 30 milliLiter(s) Oral every 12 hours PRN Indigestion  diphenhydrAMINE   Injectable 12.5 milliGRAM(s) IV Push every 4 hours PRN Itching  oxyCODONE    5 mG/acetaminophen 325 mG 1 Tablet(s) Oral every 4 hours PRN Moderate Pain  oxyCODONE    5 mG/acetaminophen 325 mG 2 Tablet(s) Oral every 6 hours PRN Severe Pain        ROS: all systems reviewed and wnl      PHYSICAL EXAMINATION:  Vital Signs Last 24 Hrs  T(C): 36.9 (10 Feb 2018 11:50), Max: 37.6 (09 Feb 2018 20:10)  T(F): 98.4 (10 Feb 2018 11:50), Max: 99.6 (09 Feb 2018 20:10)  HR: 82 (10 Feb 2018 11:50) (66 - 97)  BP: 145/56 (10 Feb 2018 11:50) (140/53 - 180/71)  BP(mean): 84 (09 Feb 2018 20:00) (77 - 84)  RR: 16 (10 Feb 2018 11:50) (15 - 20)  SpO2: 99% (10 Feb 2018 11:50) (89% - 99%)  CAPILLARY BLOOD GLUCOSE          02-09 @ 07:01  -  02-10 @ 07:00  --------------------------------------------------------  IN: 625 mL / OUT: 1175 mL / NET: -550 mL    02-10 @ 07:01  -  02-10 @ 14:34  --------------------------------------------------------  IN: 100 mL / OUT: 290 mL / NET: -190 mL        GENERAL: alert, NAD off BIPAP during the day, no CP or SOB currently  NECK: supple, No JVD  CHEST/LUNG: clear to auscultation bilaterally; no rales, rhonchi, or wheezing b/l  HEART: normal S1, S2  ABDOMEN: BS+, soft, ND, NT   EXTREMITIES:  pulses palpable; no clubbing, cyanosis, or edema b/l LEs  SKIN: no rashes or lesions      LABS:                        10.2   10.79 )-----------( 175      ( 10 Feb 2018 07:48 )             30.5     02-10    144  |  109<H>  |  29<H>  ----------------------------<  104<H>  3.8   |  29  |  1.08    Ca    8.8      10 Feb 2018 07:48  Phos  1.8     02-10  Mg     1.9     02-10    TPro  x   /  Alb  x   /  TBili  x   /  DBili  .23<H>  /  AST  x   /  ALT  x   /  AlkPhos  x   02-10    PT/INR - ( 09 Feb 2018 04:40 )   PT: 11.5 sec;   INR: 1.05 ratio

## 2018-02-10 NOTE — DISCHARGE NOTE ADULT - MEDICATION SUMMARY - MEDICATIONS TO TAKE
I will START or STAY ON the medications listed below when I get home from the hospital:    predniSONE 5 mg oral tablet  -- 4 tabs by mouth once a day on day 1  3 tabs on day 2  3 tabs on day 3  2 tabs on day 4  2 tabs on day 5  1 tab on day 6  -- It is very important that you take or use this exactly as directed.  Do not skip doses or discontinue unless directed by your doctor.  Obtain medical advice before taking any non-prescription drugs as some may affect the action of this medication.  Take with food or milk.    -- Indication: For Shortness of breath    budesonide 0.5 mg/2 mL inhalation suspension  --  inhaled   -- Indication: For Shortness of breath    aspirin 325 mg oral delayed release tablet  -- 1 tab(s) by mouth once a day MDD:1 tab  -- Indication: For Heart disease    oxyCODONE 5 mg oral tablet  -- 1 tab(s) by mouth every 4 to 6 hours, As Needed -for severe pain MDD:6 tabs  -- Caution federal law prohibits the transfer of this drug to any person other  than the person for whom it was prescribed.  It is very important that you take or use this exactly as directed.  Do not skip doses or discontinue unless directed by your doctor.  May cause drowsiness.  Alcohol may intensify this effect.  Use care when operating dangerous machinery.  This prescription cannot be refilled.  Using more of this medication than prescribed may cause serious breathing problems.    -- Indication: For Pain    losartan 100 mg oral tablet  -- 1 tab(s) by mouth once a day  -- Indication: For HTN    doxazosin 8 mg oral tablet  -- 2 tab(s) by mouth once a day in pm  -- Indication: For HTN    atorvastatin 20 mg oral tablet  -- 1 tab(s) by mouth Mon Weds Fri  -- Indication: For HLD    metoprolol tartrate 50 mg oral tablet  -- 1 tab(s) by mouth 2 times a day  -- Indication: For HTN    amLODIPine 10 mg oral tablet  -- 1 tab(s) by mouth once a day in am  -- Indication: For HTN    Colace 100 mg oral capsule  -- 1 cap(s) by mouth 2 times a day, As Needed -for constipation MDD:2 caps  -- Medication should be taken with plenty of water.    -- Indication: For Constipation    senna oral tablet  -- 2 tab(s) by mouth once a day (at bedtime)  -- Indication: For Constipation I will START or STAY ON the medications listed below when I get home from the hospital:    predniSONE 5 mg oral tablet  -- 4 tabs by mouth once a day on day 1  3 tabs on day 2  3 tabs on day 3  2 tabs on day 4  2 tabs on day 5  1 tab on day 6  -- It is very important that you take or use this exactly as directed.  Do not skip doses or discontinue unless directed by your doctor.  Obtain medical advice before taking any non-prescription drugs as some may affect the action of this medication.  Take with food or milk.    -- Indication: For Shortness of breath    budesonide 0.5 mg/2 mL inhalation suspension  -- 1 puff(s) inhaled every 6 hours MDD:4 treatments  -- Indication: For Shortness of breath    aspirin 325 mg oral delayed release tablet  -- 1 tab(s) by mouth once a day MDD:1 tab  -- Indication: For Heart disease    oxyCODONE 5 mg oral tablet  -- 1 tab(s) by mouth every 4 to 6 hours, As Needed -for severe pain MDD:6 tabs  -- Caution federal law prohibits the transfer of this drug to any person other  than the person for whom it was prescribed.  It is very important that you take or use this exactly as directed.  Do not skip doses or discontinue unless directed by your doctor.  May cause drowsiness.  Alcohol may intensify this effect.  Use care when operating dangerous machinery.  This prescription cannot be refilled.  Using more of this medication than prescribed may cause serious breathing problems.    -- Indication: For Pain    losartan 100 mg oral tablet  -- 1 tab(s) by mouth once a day  -- Indication: For HTN    doxazosin 8 mg oral tablet  -- 2 tab(s) by mouth once a day in pm  -- Indication: For HTN    atorvastatin 20 mg oral tablet  -- 1 tab(s) by mouth Mon Weds Fri  -- Indication: For HLD    metoprolol tartrate 50 mg oral tablet  -- 1 tab(s) by mouth 2 times a day  -- Indication: For HTN    amLODIPine 10 mg oral tablet  -- 1 tab(s) by mouth once a day in am  -- Indication: For HTN    Colace 100 mg oral capsule  -- 1 cap(s) by mouth 2 times a day, As Needed -for constipation MDD:2 caps  -- Medication should be taken with plenty of water.    -- Indication: For Constipation    senna oral tablet  -- 2 tab(s) by mouth once a day (at bedtime)  -- Indication: For Constipation

## 2018-02-10 NOTE — DISCHARGE NOTE ADULT - HOSPITAL COURSE
79 y/o female s/p laminectomy and fusion L3-5. Operation went well. Patient received 2 units PRBC post-op. Patient had two RRTs due to SOB. Patient was seen by medicine, critical care, cardiology, and pulmonology. Patient improved.  At the time of discharge, the patient was hemodynamically stable, was tolerating PO diet, was voiding urine, was ambulating, and was comfortable with adequate pain control. No nausea, vomiting, fever, chills. Patient instructed to follow up and to call the office to make an appointment. Patient instructed to call for any fever over 101, nausea, vomiting, severe pain, no passing of gas or bowel movement. Patient understood.

## 2018-02-10 NOTE — DISCHARGE NOTE ADULT - SECONDARY DIAGNOSIS.
Atrial fibrillation, unspecified type Bladder cancer Chronic obstructive pulmonary disease, unspecified COPD type Dyslipidemia Essential hypertension PVD (peripheral vascular disease) with claudication

## 2018-02-10 NOTE — PROGRESS NOTE ADULT - PROBLEM SELECTOR PLAN 2
concern for PE, but CTA and dopplers negative,   now with acute resp hypoxic and hypercarbic failure, acidotic, being managed with BIPAP at night. pulm and critical care now following. CXR ordered for AM.   Continue all present meds.

## 2018-02-10 NOTE — DISCHARGE NOTE ADULT - CARE PLAN
Principal Discharge DX:	Lumbar radiculopathy  Goal:	Post-op pain control  Assessment and plan of treatment:	Follow up in 10-14 days with Dr. Leal. Please call the office to make an appointment. Activity as tolerated. Rest as needed. You may eat a regular diet. Do not drive while taking narcotic pain medication. Take Colace to prevent constipation. Call for any fever over 101, nausea, vomiting, severe pain, no passing of gas or bowel movement. Leave the dressing in place.  Secondary Diagnosis:	Atrial fibrillation, unspecified type  Assessment and plan of treatment:	Follow up with your primary care physician and cardiologist.  Secondary Diagnosis:	Bladder cancer  Assessment and plan of treatment:	Follow up with your primary care physician.  Secondary Diagnosis:	Chronic obstructive pulmonary disease, unspecified COPD type  Assessment and plan of treatment:	Follow up with your primary care physician and pulmonologist.  Secondary Diagnosis:	Dyslipidemia  Assessment and plan of treatment:	Follow up with your primary care physician.  Secondary Diagnosis:	Essential hypertension  Assessment and plan of treatment:	Follow up with your primary care physician.  Secondary Diagnosis:	PVD (peripheral vascular disease) with claudication  Assessment and plan of treatment:	Follow up with your primary care physician. Principal Discharge DX:	Lumbar radiculopathy  Goal:	Post-op pain control  Assessment and plan of treatment:	Follow up in 10-14 days with Dr. Leal. Please call the office to make an appointment. Activity as tolerated. Rest as needed. You may eat a regular diet. Do not drive while taking narcotic pain medication. Take Colace to prevent constipation. Call for any fever over 101, nausea, vomiting, severe pain, no passing of gas or bowel movement. Leave the dressing in place.  Secondary Diagnosis:	Atrial fibrillation, unspecified type  Assessment and plan of treatment:	Please follow up with Dr. Blanchard at [facility] within 1-2 weeks  Please follow up with your primary care physician  Secondary Diagnosis:	Bladder cancer  Assessment and plan of treatment:	Follow up with your primary care physician.  Secondary Diagnosis:	Chronic obstructive pulmonary disease, unspecified COPD type  Assessment and plan of treatment:	Please follow up with Dr. Pink, pulmonologist, in his office.  You may call at [phone] to schedule an appointment.  Secondary Diagnosis:	Dyslipidemia  Assessment and plan of treatment:	Follow up with your primary care physician.  Secondary Diagnosis:	Essential hypertension  Assessment and plan of treatment:	Follow up with your primary care physician.  Secondary Diagnosis:	PVD (peripheral vascular disease) with claudication  Assessment and plan of treatment:	Follow up with your primary care physician. Principal Discharge DX:	Lumbar radiculopathy  Goal:	Post-op pain control  Assessment and plan of treatment:	Follow up in 10-14 days with Dr. Leal. Please call the office to make an appointment. Activity as tolerated. Rest as needed. You may eat a regular diet. Do not drive while taking narcotic pain medication. Take Colace to prevent constipation. Call/return to ED for any fever over 101, nausea, vomiting, severe pain, no passing of gas or bowel movement, chest pain, shortness of breath, fever. Leave the dressing in place.  Secondary Diagnosis:	Atrial fibrillation, unspecified type  Assessment and plan of treatment:	Please follow up with Dr. Gama Blanchard at [facility] within 1-2 weeks. You may call his office at [phone] to schedule an appointment.   Please follow up with your primary care physician  Secondary Diagnosis:	Bladder cancer  Assessment and plan of treatment:	Follow up with your primary care physician.  Secondary Diagnosis:	Chronic obstructive pulmonary disease, unspecified COPD type  Assessment and plan of treatment:	Please follow up with Dr. Pink, pulmonologist, in his office.  You may call at [phone] to schedule an appointment.  Secondary Diagnosis:	Dyslipidemia  Assessment and plan of treatment:	Follow up with your primary care physician.  Secondary Diagnosis:	Essential hypertension  Assessment and plan of treatment:	Follow up with your primary care physician.  Secondary Diagnosis:	PVD (peripheral vascular disease) with claudication  Assessment and plan of treatment:	Follow up with your primary care physician.

## 2018-02-10 NOTE — DISCHARGE NOTE ADULT - DURABLE MEDICAL EQUIPMENT AGENCY
Formerly [facility], [facility] [place] [phone]

## 2018-02-11 LAB
ANION GAP SERPL CALC-SCNC: 7 MMOL/L — SIGNIFICANT CHANGE UP (ref 5–17)
BUN SERPL-MCNC: 22 MG/DL — SIGNIFICANT CHANGE UP (ref 7–23)
CALCIUM SERPL-MCNC: 8.6 MG/DL — SIGNIFICANT CHANGE UP (ref 8.5–10.1)
CHLORIDE SERPL-SCNC: 107 MMOL/L — SIGNIFICANT CHANGE UP (ref 96–108)
CO2 SERPL-SCNC: 29 MMOL/L — SIGNIFICANT CHANGE UP (ref 22–31)
CREAT SERPL-MCNC: 0.97 MG/DL — SIGNIFICANT CHANGE UP (ref 0.5–1.3)
GLUCOSE SERPL-MCNC: 93 MG/DL — SIGNIFICANT CHANGE UP (ref 70–99)
HCT VFR BLD CALC: 31.1 % — LOW (ref 34.5–45)
HGB BLD-MCNC: 10.5 G/DL — LOW (ref 11.5–15.5)
MCHC RBC-ENTMCNC: 30.3 PG — SIGNIFICANT CHANGE UP (ref 27–34)
MCHC RBC-ENTMCNC: 33.8 GM/DL — SIGNIFICANT CHANGE UP (ref 32–36)
MCV RBC AUTO: 89.6 FL — SIGNIFICANT CHANGE UP (ref 80–100)
NRBC # BLD: 0 /100 WBCS — SIGNIFICANT CHANGE UP (ref 0–0)
PLATELET # BLD AUTO: 199 K/UL — SIGNIFICANT CHANGE UP (ref 150–400)
POTASSIUM SERPL-MCNC: 3.9 MMOL/L — SIGNIFICANT CHANGE UP (ref 3.5–5.3)
POTASSIUM SERPL-SCNC: 3.9 MMOL/L — SIGNIFICANT CHANGE UP (ref 3.5–5.3)
RBC # BLD: 3.47 M/UL — LOW (ref 3.8–5.2)
RBC # FLD: 14.1 % — SIGNIFICANT CHANGE UP (ref 10.3–14.5)
SODIUM SERPL-SCNC: 143 MMOL/L — SIGNIFICANT CHANGE UP (ref 135–145)
TROPONIN I SERPL-MCNC: 1.1 NG/ML — HIGH (ref 0.01–0.04)
WBC # BLD: 6.96 K/UL — SIGNIFICANT CHANGE UP (ref 3.8–10.5)
WBC # FLD AUTO: 6.96 K/UL — SIGNIFICANT CHANGE UP (ref 3.8–10.5)

## 2018-02-11 PROCEDURE — 99233 SBSQ HOSP IP/OBS HIGH 50: CPT

## 2018-02-11 PROCEDURE — 71045 X-RAY EXAM CHEST 1 VIEW: CPT | Mod: 26

## 2018-02-11 RX ORDER — IPRATROPIUM/ALBUTEROL SULFATE 18-103MCG
3 AEROSOL WITH ADAPTER (GRAM) INHALATION EVERY 8 HOURS
Qty: 0 | Refills: 0 | Status: DISCONTINUED | OUTPATIENT
Start: 2018-02-11 | End: 2018-02-12

## 2018-02-11 RX ORDER — IPRATROPIUM/ALBUTEROL SULFATE 18-103MCG
3 AEROSOL WITH ADAPTER (GRAM) INHALATION EVERY 6 HOURS
Qty: 0 | Refills: 0 | Status: DISCONTINUED | OUTPATIENT
Start: 2018-02-11 | End: 2018-02-11

## 2018-02-11 RX ADMIN — Medication 40 MILLIGRAM(S): at 18:41

## 2018-02-11 RX ADMIN — OXYCODONE AND ACETAMINOPHEN 1 TABLET(S): 5; 325 TABLET ORAL at 07:30

## 2018-02-11 RX ADMIN — Medication 8 MILLIGRAM(S): at 23:22

## 2018-02-11 RX ADMIN — OXYCODONE AND ACETAMINOPHEN 1 TABLET(S): 5; 325 TABLET ORAL at 06:16

## 2018-02-11 RX ADMIN — Medication 0.5 MILLIGRAM(S): at 17:19

## 2018-02-11 RX ADMIN — Medication 40 MILLIGRAM(S): at 06:13

## 2018-02-11 RX ADMIN — AMLODIPINE BESYLATE 10 MILLIGRAM(S): 2.5 TABLET ORAL at 06:13

## 2018-02-11 RX ADMIN — LOSARTAN POTASSIUM 100 MILLIGRAM(S): 100 TABLET, FILM COATED ORAL at 06:13

## 2018-02-11 RX ADMIN — Medication 1 TABLET(S): at 11:30

## 2018-02-11 RX ADMIN — Medication 1 TABLET(S): at 18:41

## 2018-02-11 RX ADMIN — Medication 1 TABLET(S): at 09:06

## 2018-02-11 RX ADMIN — OXYCODONE AND ACETAMINOPHEN 1 TABLET(S): 5; 325 TABLET ORAL at 00:03

## 2018-02-11 RX ADMIN — Medication 50 MILLIGRAM(S): at 06:13

## 2018-02-11 RX ADMIN — Medication 325 MILLIGRAM(S): at 11:28

## 2018-02-11 RX ADMIN — Medication 3 MILLILITER(S): at 12:16

## 2018-02-11 RX ADMIN — Medication 50 MILLIGRAM(S): at 18:41

## 2018-02-11 NOTE — PROGRESS NOTE ADULT - SUBJECTIVE AND OBJECTIVE BOX
VITALS/LABS  2/11/2018 afeb 68 130/40 19 94% 64   2/11/2018 W 6.9 Hb 10.5 Plt 199 Na 143 K 3.9 CO2 29 Cr .9   REVIEW OF SYMPTOMS    Able to give ROS  Yes     RELIABLE No   CONSTITUTIONAL Weakness Yes  Chills No Vision changes No  ENDOCRINE No unexplained hair loss No heat or cold intolerance    ALLERGY No hives  Sore throat No   RESP Coughing blood no  Shortness of breath YES   NEURO No Headache  Confusion Pain neck No   CARDIAC No Chest pain No Palpitations   GI No Pain abdomen NO   Vomiting NO   PHYSICAL EXAM    HEENT Unremarkable PERRLA atraumatic   RESP Fair air entry EXP prolonged    Harsh breath sound Resp distres mild   CARDIAC S1 S2 No S3     NO JVD    ABDOMEN SOFT BS PRESENT NOT DISTENDED No hepatosplenomegaly PEDAL EDEMA present No calf tenderness  NO rash   GENERAL Not TOXIC looking  GLOBAL ISSUE/BEST PRACTICE:        PROBLEM: Analgesia:     na                        PROBLEM: Sedation:     na               PROBLEM: HOB elevation:   y             PROBLEM: Stress ulcer proph:    na                      PROBLEM: VTE prophylaxis:       (2/8)     No ac being used due to recent spine surgery (2/9)               PROBLEM: Glycemic control:    na   PROBLEM: Nutrition:    Reg (2/7)           PROBLEM: Advanced directive: na     PROBLEM: Allergies:  na  PATIENT SUMMARY  PROBLEM ASSESSMENT PLAN 2/6/2018 ADMISSION  LVS     2/10 PT TRANSFERRED TO    78 f former smoker Not on home nebulizer or oxygen PSH L CEA CABG porcine AV  2 y ago PMH HTN, HLD, ASHD, urine bladder surgery 13 y ago admitted LVS 2/6/2018 for elective lumbar spinal laminectomy and spinal fusion. Had pre-op chemical stress test 2 months prior to operation and was reported to patient as normal.   HOSPITAL COURSE 2/6 ADMISSION [facility]   Laminectomy and fusion of lumbar spine with instrumentation done on  02/06/2018 for Spinal stenosis of lumbar region with neurogenic claudication  02/06/2018   Postop on 2/7 and 2/8 developed episodes of desaturation including a rapid response in radiology 2/8 leading to transfer to ICU where she was Rxed with BPAP CTA chest 2/8 was neg for PE Pt did have troponinemia on 2/8 and Cardio and Pulm were consulted 2/8  COPD EX was Rxed with BD steroids  CHF was Rxed with duresis   TROPONINEMIA was felt to be demand ischemia   On2/10 PT TRANSFERRED FROM ICU TO      PROBLEMS   ACUTE HYPOXIC HYPERCAPNIC RESP FAILURE RESOLVED   2/9 7a BPAP 16/6/.5 740/40/103 2/9 Gas exchange acceptable   COPD EX   On  Duoneb.4 (2/8) Pulmicort (2/10) Solumed 40 (2/10) --> Pred 40 (2/11)   Improved  RESP TRACT INFECTION   2/8 Flu AB n 2/9 rvp n 2/9 pct .19  2/9 W 10.1 2/9 bc n 2/8 bc n   No strong evidence of active infection   PNEUMONIA PPX   Chlorhexidine .12% 15.2 (2/8)    VENOUS THROMBOEMBOLISM RULED OUT AS CAUSE OF HYPOXIA   2/8 CTA n 2/8 V dupl nn  POSSIBLE ACUTE NSTEMI   2/8-2/9  [phone]    2/8-2/9-2/10 Tr 1 1.5-2-1.8-1.4-1.5 -1.7  On  (2/8) Atorvastat 20 (2/6) Losartan 100 (2/6) Metoprolol 50 .2 (2/6)   2/9/2018 Cannot use FD hep bec of recent spine surgery  CHF  ECHO 2/8 EF 50% pasp 55 mod mr aortic valve bioprosthesis     STATUS POST SPNE SURGERY (2/6)   MALNUTRITION (2/9 Alb 2.5)     PLAN SUGGESTIONS  2/11 DC planning Home O2 need determiunation requested   2/10 Dyspnea episode may have been sec combination of CHF and COPD steroids added   2/10 KPO4 replaced   2/10 ICS IVCS added  2/9/2018 Suggest inhaled corticosteroids and IV steroids   2/9 Monitor ABG vbg po   2/9 Recommend DVT prophylaxis (pharmac) when cleared by Ortho    TIME SPENT Over 25 minutes aggregate care time spent on encounter; activities included   direct patient care, counseling and/or coordinating care reviewing notes, lab data/ imaging , discussion with multidisciplinary team/ patient  /family. Risks, benefits, alternatives  discussed in detail. Proper antibiotic use issues addressed Questions/concerns  were addressed  as  best as possible

## 2018-02-11 NOTE — PROGRESS NOTE ADULT - PROBLEM SELECTOR PLAN 2
concern for PE, but CTA and dopplers negative,   now with acute resp hypoxic and hypercarbic failure, now improving, being managed with BIPAP at night. pulm and critical care now following. CXR ordered for AM.   Continue all present meds. concern for PE, but CTA and dopplers negative,   now with acute resp hypoxic and hypercarbic failure, now improving, being managed with BIPAP at night. Will stop BIPAP. CXR ordered for AM today.   Continue all present meds. Change to oral prednisone 40 mg/day, Duonebs every 6 hours and Pulmicort bid. Check RA sat every shift.

## 2018-02-11 NOTE — PROGRESS NOTE ADULT - PROBLEM SELECTOR PLAN 1
s/p spinal fusion and laminectomy, post -op care as per ortho  pain management with bowel regimen, OT/PT, incentive spirometer s/p spinal fusion and laminectomy, post -op care as per ortho  pain management with bowel regimen, OT/PT, incentive spirometer.   PT re-eval ordered.

## 2018-02-11 NOTE — PROGRESS NOTE ADULT - PROBLEM SELECTOR PLAN 3
made troponins, cardiology to fu re; further management, AC ok as per ortho spine if needed. Troponins decreased today to 1.10, demand ischemia per cardio. made troponins, cardiology to fu re; further management, AC ok as per ortho spine if needed. Troponins decreased today to 1.10, demand ischemia per cardio.  Continue all BP meds.

## 2018-02-11 NOTE — PROGRESS NOTE ADULT - SUBJECTIVE AND OBJECTIVE BOX
Patient seen and examined at bedside.  Offering no new complaints.   Back pain well controlled on current pain regimen.   Tolerating regular diet.  Denies CP, SOB, N/V/D, weakness, numbness.     Vital Signs Last 24 Hrs  T(F): 98.4 (02-11-18 @ 05:17), Max: 98.4 (02-10-18 @ 11:50)  HR: 80 (02-11-18 @ 09:30)  BP: 166/50 (02-11-18 @ 05:17)  RR: 19 (02-11-18 @ 05:17)  SpO2: 92% (02-11-18 @ 09:30)    General: Alert, oriented, NAD  HEENT: NC in place  Lungs: Good respiratory effort, respirations nonlabored  CV: S1, S2 no murmurs  Abd: Soft, NTND  Back: Aquacel Dressing clean/dry/intact, no localized erythema/edema  EXT: Distal pulses (+2) bilaterally, Calves supple/nontender bilaterally  Sensation grossly intact to light touch bilaterally  EHL/FHL/GS/TA intact bilaterally    LABS:                        10.5   6.96  )-----------( 199      ( 11 Feb 2018 07:10 )             31.1     02-11    143  |  107  |  22  ----------------------------<  93  3.9   |  29  |  0.97    Ca    8.6      11 Feb 2018 07:10  Phos  1.8     02-10  Mg     1.9     02-10    TPro  x   /  Alb  x   /  TBili  x   /  DBili  .23<H>  /  AST  x   /  ALT  x   /  AlkPhos  x   02-10      Assessment: 77 y/o female s/p laminectomy and fusion L3-5. s/p 2uPRBC on 2/6. RRT x 2 on 2/8 for SOB and desaturating, now stable. Elevated troponin on 2/8, now downtrending  - Pain control as clinically indicated  - DVT prophylaxis: SCDs       - PT/OT: Weight bearing as tolerated, spine precautions   - Incentive spirometry  - Follow up labs  - Pt stable per internal medicine; continue management until discharge.   - D/C planning once stable from cardio/pulm.   - Will discuss case with Dr Leal.     Wilbur PARHAM-Student

## 2018-02-11 NOTE — PROGRESS NOTE ADULT - SUBJECTIVE AND OBJECTIVE BOX
INTERVAL HPI/OVERNIGHT EVENTS:  Pt seen and examined at bedside.     Allergies/Intolerance: No Known Allergies      MEDICATIONS  (STANDING):  ALBUTerol/ipratropium for Nebulization 3 milliLiter(s) Nebulizer three times a day  amLODIPine   Tablet 10 milliGRAM(s) Oral daily  aspirin enteric coated 325 milliGRAM(s) Oral daily  atorvastatin 20 milliGRAM(s) Oral <User Schedule>  buDESOnide   0.5 milliGRAM(s) Respule 0.5 milliGRAM(s) Inhalation two times a day  docusate sodium 100 milliGRAM(s) Oral three times a day  doxazosin 8 milliGRAM(s) Oral at bedtime  losartan 100 milliGRAM(s) Oral daily  methylPREDNISolone sodium succinate Injectable 40 milliGRAM(s) IV Push daily  metoprolol     tartrate 50 milliGRAM(s) Oral two times a day  potassium acid phosphate/sodium acid phosphate tablet (K-PHOS No. 2) 1 Tablet(s) Oral four times a day with meals  senna 2 Tablet(s) Oral at bedtime    MEDICATIONS  (PRN):  ALBUTerol/ipratropium for Nebulization 3 milliLiter(s) Nebulizer every 4 hours PRN Shortness of Breath and/or Wheezing  aluminum hydroxide/magnesium hydroxide/simethicone Suspension 30 milliLiter(s) Oral every 12 hours PRN Indigestion  diphenhydrAMINE   Injectable 12.5 milliGRAM(s) IV Push every 4 hours PRN Itching  oxyCODONE    5 mG/acetaminophen 325 mG 1 Tablet(s) Oral every 4 hours PRN Moderate Pain        ROS: all systems reviewed and wnl      PHYSICAL EXAMINATION:  Vital Signs Last 24 Hrs  T(C): 36.9 (11 Feb 2018 05:17), Max: 36.9 (10 Feb 2018 11:50)  T(F): 98.4 (11 Feb 2018 05:17), Max: 98.4 (10 Feb 2018 11:50)  HR: 80 (11 Feb 2018 05:47) (55 - 86)  BP: 166/50 (11 Feb 2018 05:17) (145/56 - 187/80)  BP(mean): --  RR: 19 (11 Feb 2018 05:17) (16 - 19)  SpO2: 92% (11 Feb 2018 05:47) (89% - 99%)  CAPILLARY BLOOD GLUCOSE          02-10 @ 07:01  -  02-11 @ 07:00  --------------------------------------------------------  IN: 400 mL / OUT: 290 mL / NET: 110 mL        GENERAL:   NECK: supple, No JVD  CHEST/LUNG: clear to auscultation bilaterally; no rales, rhonchi, or wheezing b/l  HEART: normal S1, S2  ABDOMEN: BS+, soft, ND, NT   EXTREMITIES:  pulses palpable; no clubbing, cyanosis, or edema b/l LEs  SKIN: no rashes or lesions      LABS:                        10.5   6.96  )-----------( 199      ( 11 Feb 2018 07:10 )             31.1     02-11    143  |  107  |  22  ----------------------------<  93  3.9   |  29  |  0.97    Ca    8.6      11 Feb 2018 07:10  Phos  1.8     02-10  Mg     1.9     02-10    TPro  x   /  Alb  x   /  TBili  x   /  DBili  .23<H>  /  AST  x   /  ALT  x   /  AlkPhos  x   02-10 INTERVAL HPI/OVERNIGHT EVENTS:  Pt seen and examined at bedside.     Allergies/Intolerance: No Known Allergies      MEDICATIONS  (STANDING):  ALBUTerol/ipratropium for Nebulization 3 milliLiter(s) Nebulizer three times a day  amLODIPine   Tablet 10 milliGRAM(s) Oral daily  aspirin enteric coated 325 milliGRAM(s) Oral daily  atorvastatin 20 milliGRAM(s) Oral <User Schedule>  buDESOnide   0.5 milliGRAM(s) Respule 0.5 milliGRAM(s) Inhalation two times a day  docusate sodium 100 milliGRAM(s) Oral three times a day  doxazosin 8 milliGRAM(s) Oral at bedtime  losartan 100 milliGRAM(s) Oral daily  methylPREDNISolone sodium succinate Injectable 40 milliGRAM(s) IV Push daily  metoprolol     tartrate 50 milliGRAM(s) Oral two times a day  potassium acid phosphate/sodium acid phosphate tablet (K-PHOS No. 2) 1 Tablet(s) Oral four times a day with meals  senna 2 Tablet(s) Oral at bedtime    MEDICATIONS  (PRN):  ALBUTerol/ipratropium for Nebulization 3 milliLiter(s) Nebulizer every 4 hours PRN Shortness of Breath and/or Wheezing  aluminum hydroxide/magnesium hydroxide/simethicone Suspension 30 milliLiter(s) Oral every 12 hours PRN Indigestion  diphenhydrAMINE   Injectable 12.5 milliGRAM(s) IV Push every 4 hours PRN Itching  oxyCODONE    5 mG/acetaminophen 325 mG 1 Tablet(s) Oral every 4 hours PRN Moderate Pain        ROS: all systems reviewed and wnl      PHYSICAL EXAMINATION:  Vital Signs Last 24 Hrs  T(C): 36.9 (11 Feb 2018 05:17), Max: 36.9 (10 Feb 2018 11:50)  T(F): 98.4 (11 Feb 2018 05:17), Max: 98.4 (10 Feb 2018 11:50)  HR: 80 (11 Feb 2018 05:47) (55 - 86)  BP: 166/50 (11 Feb 2018 05:17) (145/56 - 187/80)  BP(mean): --  RR: 19 (11 Feb 2018 05:17) (16 - 19)  SpO2: 92% (11 Feb 2018 05:47) (89% - 99%)  CAPILLARY BLOOD GLUCOSE          02-10 @ 07:01  -  02-11 @ 07:00  --------------------------------------------------------  IN: 400 mL / OUT: 290 mL / NET: 110 mL        GENERAL: alert, comfortable, NAD, no wheeze, cough or CP  NECK: supple, No JVD  CHEST/LUNG: clear to auscultation bilaterally; no rales, rhonchi, or wheezing b/l  HEART: normal S1, S2  ABDOMEN: BS+, soft, ND, NT   EXTREMITIES:  pulses palpable; no clubbing, cyanosis, or edema b/l LEs  SKIN: no rashes or lesions      LABS:                        10.5   6.96  )-----------( 199      ( 11 Feb 2018 07:10 )             31.1     02-11    143  |  107  |  22  ----------------------------<  93  3.9   |  29  |  0.97    Ca    8.6      11 Feb 2018 07:10  Phos  1.8     02-10  Mg     1.9     02-10    TPro  x   /  Alb  x   /  TBili  x   /  DBili  .23<H>  /  AST  x   /  ALT  x   /  AlkPhos  x   02-10

## 2018-02-11 NOTE — PROGRESS NOTE ADULT - ASSESSMENT
79 yo F with h/o afib, copd, HTn, PVD, CAD (s/p CABG and AVR), carotid stenosis, s/p endarterectomy, bladder and colon cancer (s/p chemo 2013), former smoker, current every day EtOH 3-4 drinks per day admitted with lumbar radiculopathy s/p spinal fusion and laminectomy

## 2018-02-12 VITALS
RESPIRATION RATE: 17 BRPM | OXYGEN SATURATION: 94 % | HEART RATE: 60 BPM | DIASTOLIC BLOOD PRESSURE: 47 MMHG | SYSTOLIC BLOOD PRESSURE: 146 MMHG | TEMPERATURE: 97 F

## 2018-02-12 LAB
ANION GAP SERPL CALC-SCNC: 6 MMOL/L — SIGNIFICANT CHANGE UP (ref 5–17)
BUN SERPL-MCNC: 23 MG/DL — SIGNIFICANT CHANGE UP (ref 7–23)
CALCIUM SERPL-MCNC: 8.6 MG/DL — SIGNIFICANT CHANGE UP (ref 8.5–10.1)
CHLORIDE SERPL-SCNC: 107 MMOL/L — SIGNIFICANT CHANGE UP (ref 96–108)
CO2 SERPL-SCNC: 29 MMOL/L — SIGNIFICANT CHANGE UP (ref 22–31)
CREAT SERPL-MCNC: 1.01 MG/DL — SIGNIFICANT CHANGE UP (ref 0.5–1.3)
GLUCOSE SERPL-MCNC: 121 MG/DL — HIGH (ref 70–99)
HCT VFR BLD CALC: 29.4 % — LOW (ref 34.5–45)
HGB BLD-MCNC: 9.8 G/DL — LOW (ref 11.5–15.5)
MCHC RBC-ENTMCNC: 29.6 PG — SIGNIFICANT CHANGE UP (ref 27–34)
MCHC RBC-ENTMCNC: 33.3 GM/DL — SIGNIFICANT CHANGE UP (ref 32–36)
MCV RBC AUTO: 88.8 FL — SIGNIFICANT CHANGE UP (ref 80–100)
NRBC # BLD: 0 /100 WBCS — SIGNIFICANT CHANGE UP (ref 0–0)
PHOSPHATE SERPL-MCNC: 4.2 MG/DL — SIGNIFICANT CHANGE UP (ref 2.5–4.5)
PLATELET # BLD AUTO: 205 K/UL — SIGNIFICANT CHANGE UP (ref 150–400)
POTASSIUM SERPL-MCNC: 4.2 MMOL/L — SIGNIFICANT CHANGE UP (ref 3.5–5.3)
POTASSIUM SERPL-SCNC: 4.2 MMOL/L — SIGNIFICANT CHANGE UP (ref 3.5–5.3)
RBC # BLD: 3.31 M/UL — LOW (ref 3.8–5.2)
RBC # FLD: 13.7 % — SIGNIFICANT CHANGE UP (ref 10.3–14.5)
SODIUM SERPL-SCNC: 142 MMOL/L — SIGNIFICANT CHANGE UP (ref 135–145)
WBC # BLD: 8.17 K/UL — SIGNIFICANT CHANGE UP (ref 3.8–10.5)
WBC # FLD AUTO: 8.17 K/UL — SIGNIFICANT CHANGE UP (ref 3.8–10.5)

## 2018-02-12 PROCEDURE — 99232 SBSQ HOSP IP/OBS MODERATE 35: CPT

## 2018-02-12 PROCEDURE — 99233 SBSQ HOSP IP/OBS HIGH 50: CPT

## 2018-02-12 RX ORDER — OXYCODONE HYDROCHLORIDE 5 MG/1
1 TABLET ORAL
Qty: 30 | Refills: 0 | OUTPATIENT
Start: 2018-02-12

## 2018-02-12 RX ORDER — BUDESONIDE, MICRONIZED 100 %
1 POWDER (GRAM) MISCELLANEOUS
Qty: 1 | Refills: 0 | OUTPATIENT
Start: 2018-02-12

## 2018-02-12 RX ORDER — BUDESONIDE, MICRONIZED 100 %
0 POWDER (GRAM) MISCELLANEOUS
Qty: 0 | Refills: 0 | COMMUNITY
Start: 2018-02-12

## 2018-02-12 RX ORDER — DOCUSATE SODIUM 100 MG
1 CAPSULE ORAL
Qty: 20 | Refills: 0 | OUTPATIENT
Start: 2018-02-12

## 2018-02-12 RX ORDER — ASPIRIN/CALCIUM CARB/MAGNESIUM 324 MG
1 TABLET ORAL
Qty: 14 | Refills: 0 | OUTPATIENT
Start: 2018-02-12

## 2018-02-12 RX ORDER — SENNA PLUS 8.6 MG/1
2 TABLET ORAL
Qty: 0 | Refills: 0 | COMMUNITY
Start: 2018-02-12

## 2018-02-12 RX ADMIN — LOSARTAN POTASSIUM 100 MILLIGRAM(S): 100 TABLET, FILM COATED ORAL at 06:31

## 2018-02-12 RX ADMIN — Medication 1 TABLET(S): at 11:56

## 2018-02-12 RX ADMIN — Medication 1 TABLET(S): at 09:32

## 2018-02-12 RX ADMIN — AMLODIPINE BESYLATE 10 MILLIGRAM(S): 2.5 TABLET ORAL at 06:31

## 2018-02-12 RX ADMIN — OXYCODONE AND ACETAMINOPHEN 1 TABLET(S): 5; 325 TABLET ORAL at 06:34

## 2018-02-12 RX ADMIN — Medication 40 MILLIGRAM(S): at 06:31

## 2018-02-12 RX ADMIN — Medication 50 MILLIGRAM(S): at 06:31

## 2018-02-12 RX ADMIN — Medication 325 MILLIGRAM(S): at 11:55

## 2018-02-12 RX ADMIN — OXYCODONE AND ACETAMINOPHEN 1 TABLET(S): 5; 325 TABLET ORAL at 07:03

## 2018-02-12 NOTE — PROGRESS NOTE ADULT - PROBLEM SELECTOR PROBLEM 2
SOB (shortness of breath)

## 2018-02-12 NOTE — PROGRESS NOTE ADULT - SUBJECTIVE AND OBJECTIVE BOX
VITALS/LABS  2/12/2018 afeb 79 178/62 18 97% 65  2/11/2018 W 6.9 Hb 10.5 Plt 199 Na 143 K 3.9 CO2 29 Cr .9   GLOBAL ISSUE/BEST PRACTICE:        PROBLEM: Analgesia:     na                        PROBLEM: Sedation:     na               PROBLEM: HOB elevation:   y             PROBLEM: Stress ulcer proph:    na                      PROBLEM: VTE prophylaxis:       (2/8)     No ac being used due to recent spine surgery (2/9)               PROBLEM: Glycemic control:    na   PROBLEM: Nutrition:    Reg (2/7)           PROBLEM: Advanced directive: na     PROBLEM: Allergies:  na  REVIEW OF SYMPTOMS    Able to give ROS  Yes     RELIABLE No   CONSTITUTIONAL Weakness Yes  Chills No Vision changes No  ENDOCRINE No unexplained hair loss No heat or cold intolerance    ALLERGY No hives  Sore throat No   RESP Coughing blood no  Shortness of breath YES   NEURO No Headache  Confusion Pain neck No   CARDIAC No Chest pain No Palpitations   GI No Pain abdomen NO   Vomiting NO   PHYSICAL EXAM    HEENT Unremarkable PERRLA atraumatic   RESP Fair air entry EXP prolonged    Harsh breath sound Resp distres mild   CARDIAC S1 S2 No S3     NO JVD    ABDOMEN SOFT BS PRESENT NOT DISTENDED No hepatosplenomegaly PEDAL EDEMA present No calf tenderness  NO rash   GENERAL Not TOXIC looking  PATIENT SUMMARY  PROBLEM ASSESSMENT PLAN 2/6/2018 ADMISSION  [facility]     78 f former smoker Not on home nebulizer or oxygen PSH L CEA CABG porcine AV  2 y ago PMH HTN, HLD, ASHD, urine bladder surgery 13 y ago admitted [facility] 2/6/2018 for elective lumbar spinal laminectomy and spinal fusion. Had pre-op chemical stress test 2 months prior to operation and was reported to patient as normal.   HOSPITAL COURSE 2/6 ADMISSION [facility]   Laminectomy and fusion of lumbar spine with instrumentation done on  02/06/2018 for Spinal stenosis of lumbar region with neurogenic claudication  02/06/2018   Postop on 2/7 and 2/8 developed episodes of desaturation including a rapid response in radiology 2/8 leading to transfer to ICU where she was Rxed with BPAP CTA chest 2/8 was neg for PE Pt did have troponinemia on 2/8 and Cardio and Pulm were consulted 2/8  COPD EX was Rxed with BD steroids  CHF was Rxed with duresis   TROPONINEMIA was felt to be demand ischemia   On 2/10 PT TRANSFERRED FROM ICU TO      PROBLEMS   ACUTE HYPOXIC HYPERCAPNIC RESP FAILURE LIKELY SECONDARY TO FLUID OVERLOAD COPD COMBINATION NOW RESOLVED   2/9 7a BPAP 16/6/.5 740/40/103 2/9 Gas exchange acceptable   COPD EX   On  Duoneb.4 (2/8) Pulmicort (2/10) Solumed 40 (2/10) --> Pred 40 (2/11) --> Pred 20 (2/12   Improved  RESP TRACT INFECTION CONSIDERED UNLIKELY   2/8 Flu AB n 2/9 rvp n 2/9 pct .19  2/9 W 10.1 2/9 bc n 2/8 bc n   2/11 CXr SP cabg Tr R effsn   No strong evidence of active infection   PNEUMONIA PPX   Chlorhexidine .12% 15.2 (2/8)    VENOUS THROMBOEMBOLISM RULED OUT AS CAUSE OF HYPOXIA   2/8 CTA n 2/8 V dupl nn  POSSIBLE ACUTE NSTEMI   2/8-2/9  -250-331-206    2/8-2/9-2/10 Tr 1 1.5-2-1.8-1.4-1.5 -1.7  On  (2/8) Atorvastat 20 (2/6) Losartan 100 (2/6) Metoprolol 50 .2 (2/6)   2/9/2018 Cannot use FD hep bec of recent spine surgery  CHF  ECHO 2/8 EF 50% pasp 55 mod mr aortic valve bioprosthesis     STATUS POST SPNE SURGERY (2/6)   MALNUTRITION (2/9 Alb 2.5)     PLAN SUGGESTIONS  2/12 Pred decreased to 20   2/11 DC planning Home O2 need determiunation requested If pulse ox drops below 88% at rest or on ambulation then will need home O2   2/10 Dyspnea episode may have been sec combination of CHF and COPD steroids added   2/9 Recommend DVT prophylaxis (pharmac) when cleared by Ortho    TIME SPENT Over 25 minutes aggregate care time spent on encounter; activities included   direct patient care, counseling and/or coordinating care reviewing notes, lab data/ imaging , discussion with multidisciplinary team/ patient  /family. Risks, benefits, alternatives  discussed in detail. Proper antibiotic use issues addressed Questions/concerns  were addressed  as  best as possible

## 2018-02-12 NOTE — PROGRESS NOTE ADULT - PROBLEM SELECTOR PLAN 1
s/p spinal fusion and laminectomy, post -op care as per ortho  pain management with bowel regimen, OT/PT, incentive spirometer.   PT re-eval ordered.

## 2018-02-12 NOTE — PROGRESS NOTE ADULT - SUBJECTIVE AND OBJECTIVE BOX
Patient seen and examined in chair.  No complaints offered.  OOB to ambulate as tolerated.  Denies fever, chills, chest pain, sob.    Vital Signs Last 24 Hrs  T(F): 97.2 (02-12-18 @ 12:11), Max: 98.6 (02-12-18 @ 00:02)  HR: 60 (02-12-18 @ 12:11)  BP: 146/47 (02-12-18 @ 12:11)  RR: 17 (02-12-18 @ 12:11)  SpO2: 94% (02-12-18 @ 12:11)    GENERAL: Alert, oriented, NAD  HEENT: NC in place  CHEST/LUNG: Clear to auscultation bilaterally, respirations nonlabored  HEART: S1S2, Regular rate and rhythm  ABDOMEN: Soft, NTND  BACK: Aquacel dressing intact  EXTREMITIES: No calf tenderness, no pedal edema b/l. +DP pulses. Sensation grossly intact throughout. 5/5 strength    LABS:                        9.8    8.17  )-----------( 205      ( 12 Feb 2018 07:52 )             29.4     02-12    142  |  107  |  23  ----------------------------<  121<H>  4.2   |  29  |  1.01    Ca    8.6      12 Feb 2018 07:52      Assessment: 78F PMH COPD, Afib, CABGx2, HLD, PVD, ETOH abuse, POD6 s/p laminectomy and fusion L3-5. Post-op course complicated by acute blood loss anemia requiring 2uPRBC 2/6, RRT x 2 for acute respiratory distress on 2/8, elevated troponins likely due to demand ischemia, now downtrending  Plan:   - medicine and pulm notes appreciated; cleared for discharge with home oxygen  - discharge planning today   - post op care with pain management PRN, DVT ppx, PT/OT, IS, OOB as tolerated  - will d/w Dr. Leal

## 2018-02-12 NOTE — PROGRESS NOTE ADULT - PROBLEM SELECTOR PLAN 2
concern for PE, but CTA and dopplers negative,   with acute resp hypoxic and hypercarbic failure, now improved and back to baseline   Medically stable for d/c home on home o2  Continue all present meds. taper prednisone ,  and Pulmicort bid.

## 2018-02-12 NOTE — PROGRESS NOTE ADULT - PROBLEM/PLAN-2
This patient's medication list and chart were reviewed as part of the service provided by [facility].    Assessment/Recommendations:  1. (Depression/anxiety):  Pt may benefit from reduction in Venlafaxine ER dose.  Pt is currently on max dose of 225mg daily, and for mild-moderate renal impairment, dose should be reduced by 25-50%, therefore pt above max recommended dose due to her renal function.  Venlafaxine ER may contribute to insomnia, dizziness, weakness, dry mouth, increased BP (dose-related), nervousness/agitation, confusion, hallucinations, etc.  Please consider gradual dose reduction.  2. (Dementia w/ psychosis):  As noted above, possible that Venlafaxine ER is contributing to CNS se's, and pt has h/o hallucinations, confusion, etc.  Following potential taper of Venlafaxine ER, please consider GDR of Seroquel.  Has failed GDR in past, however, this appears to have been many months ago & possible that other meds contributing to CNS se's & dementia behavioral symptoms may wax and wane.  Seroquel increases risk of mortality in dementia, as well as orthostasis, falls, and can worsen confusion.  3. (h/o seizures):  Appears pt has h/o seizure post-CVA, and none since addition of Keppra 750mg bid.  If no seizures in last 3-5yrs, may consider reduction in Keppra dose to 500mg bid and monitor.  Keppra can contribute to CNS se's such as psychosis, anxiety/agitation.  4. (Constipation):  Consider d'c Docusate.  Minimal efficacy, pt has significant polypharm & continues on other scheduled bowel meds that are offering more benefit.    Zohreh Finney, Pharm.D.,[facility] [place]  Board Certified Geriatric Pharmacist  Medication Therapy Management Pharmacist  [phone]          
DISPLAY PLAN FREE TEXT

## 2018-02-12 NOTE — PROGRESS NOTE ADULT - SUBJECTIVE AND OBJECTIVE BOX
Patient is a 78y old  Female who presents with a chief complaint of Laminectomy with fusion (10 Feb 2018 22:25)      INTERVAL HPI/OVERNIGHT EVENTS: no events overnight     MEDICATIONS  (STANDING):  ALBUTerol/ipratropium for Nebulization 3 milliLiter(s) Nebulizer every 8 hours  amLODIPine   Tablet 10 milliGRAM(s) Oral daily  aspirin enteric coated 325 milliGRAM(s) Oral daily  atorvastatin 20 milliGRAM(s) Oral <User Schedule>  buDESOnide   0.5 milliGRAM(s) Respule 0.5 milliGRAM(s) Inhalation two times a day  docusate sodium 100 milliGRAM(s) Oral three times a day  doxazosin 8 milliGRAM(s) Oral at bedtime  losartan 100 milliGRAM(s) Oral daily  metoprolol     tartrate 50 milliGRAM(s) Oral two times a day  potassium acid phosphate/sodium acid phosphate tablet (K-PHOS No. 2) 1 Tablet(s) Oral four times a day with meals  predniSONE   Tablet 20 milliGRAM(s) Oral daily  senna 2 Tablet(s) Oral at bedtime    MEDICATIONS  (PRN):  oxyCODONE    5 mG/acetaminophen 325 mG 1 Tablet(s) Oral every 4 hours PRN Moderate Pain      Allergies    No Known Allergies    Intolerances            Vital Signs Last 24 Hrs  T(C): 36.2 (12 Feb 2018 12:11), Max: 37 (12 Feb 2018 00:02)  T(F): 97.2 (12 Feb 2018 12:11), Max: 98.6 (12 Feb 2018 00:02)  HR: 60 (12 Feb 2018 12:11) (58 - 88)  BP: 146/47 (12 Feb 2018 12:11) (146/47 - 179/71)  BP(mean): --  RR: 17 (12 Feb 2018 12:11) (17 - 19)  SpO2: 94% (12 Feb 2018 12:11) (88% - 98%)    PHYSICAL EXAM:  GENERAL: NAD, well-groomed, well-developed  HEAD:  Atraumatic, Normocephalic  EYES: EOMI, PERRLA, conjunctiva and sclera clear  ENMT: No tonsillar erythema, exudates, or enlargement; Moist mucous membranes, Good dentition, No lesions  NECK: Supple, No JVD, Normal thyroid  CHEST/LUNG: Clear to percussion bilaterally; No rales, rhonchi, wheezing, or rubs  HEART: Regular rate and rhythm; No murmurs, rubs, or gallops  ABDOMEN: Soft, Nontender, Nondistended; Bowel sounds present  EXTREMITIES:  2+ Peripheral Pulses, No clubbing, cyanosis, or edema  LYMPH: No lymphadenopathy noted  SKIN: No rashes or lesions    LABS:                        9.8    8.17  )-----------( 205      ( 12 Feb 2018 07:52 )             29.4     02-12    142  |  107  |  23  ----------------------------<  121<H>  4.2   |  29  |  1.01    Ca    8.6      12 Feb 2018 07:52          CAPILLARY BLOOD GLUCOSE          RADIOLOGY & ADDITIONAL TESTS:    Imaging Personally Reviewed:  [ ] YES  [ ] NO    Consultant(s) Notes Reviewed:  [ ] YES  [ ] NO    Care Discussed with Consultants/Other Providers [ ] YES  [ ] NO

## 2018-02-12 NOTE — PROGRESS NOTE ADULT - PROBLEM SELECTOR PLAN 3
made troponins, cardiology to fu re; further management, AC ok as per ortho spine if needed. Troponins decreased today to 1.10, demand ischemia per cardio.  Continue all BP meds.

## 2018-02-12 NOTE — PROGRESS NOTE ADULT - ASSESSMENT
78 year old female PMH of HTN, HLD, CABG ~2yrs ago and bioprosthetic aortic valve replacement; admitted for elective lumbar spinal laminectomy and spinal fusion. Had pre-op chemical stress test 2 months prior to operation and was reported to patient as normal.   RRT called for SOB and O2 desaturating to 84% on NC. Patient brought to icu, did not require intubation. placed on BiPAP. initially patient with respiratory acidosis. improved after steroids , respiratory treatments and BiPAP.   NO PE, No DVT. now being monitored on medical floor.  troponin 1.5 -> 2.0 and down to 1.5 - > 1.1  No sob/cp now. Ambulating with care. Feeling better.   Stable vitals. Likely demand ischemia.    -c/w asa/statin  -c/w bbl/ARB  -c/w amlodipine  - c/w steroids/nebulizer Rx, pulmonary following   -post op care  - out patient cardiology f/u, Dr. Blanchard at [facility], likely need ischemic cardiac w/u as outpatient. 78 year old female PMH of HTN, HLD, CABG ~2yrs ago and bioprosthetic aortic valve replacement; admitted for elective lumbar spinal laminectomy and spinal fusion. Had pre-op chemical stress test 2 months prior to operation and was reported to patient as normal.   RRT called on 2/8 early am  for SOB and O2 desaturating to 84% on NC. s/p 2uPRBC on 2/6. Likely volume overload due to PRBC. Patient's SOB improved and O2 increased to 97% with NRB, lasix and nitropaste, pt was then transferred to [facility], RRT called agian for resp. distress while in radiology that afternoon,  Patient brought to icu, did not require intubation. placed on BiPAP. initially patient with respiratory acidosis. improved after steroids , respiratory treatments and BiPAP.   NO PE, No DVT. now being monitored on medical floor.  troponin 1.5 -> 2.0 and down to 1.5 - > 1.1  No sob/cp now. Ambulating with care. Feeling better.   Stable vitals. Likely demand ischemia.    -c/w asa/statin  -c/w bbl/ARB  -c/w amlodipine  - c/w steroids/nebulizer Rx, pulmonary following   -post op care  - out patient cardiology f/u, Dr. Blanchard at [facility], likely need ischemic cardiac w/u as outpatient. 78 year old female PMH of HTN, HLD, CABG ~2yrs ago and bioprosthetic aortic valve replacement; admitted for elective lumbar spinal laminectomy and spinal fusion. Had pre-op chemical stress test 2 months prior to operation and was reported to patient as normal.   RRT called on 2/8 early am  for SOB and O2 desaturating to 84% on NC. s/p 2uPRBC on 2/6. Likely volume overload due to PRBC. Patient's SOB improved and O2 increased to 97% with NRB, lasix and nitropaste, pt was then transferred to [facility], RRT called agian for resp. distress while in radiology that afternoon,  Patient brought to icu, did not require intubation. placed on BiPAP. initially patient with respiratory acidosis. improved after steroids , respiratory treatments and BiPAP.   NO PE, No DVT. now being monitored on medical floor.  troponin 1.5 -> 2.0 and down to 1.5 - > 1.1  No sob/cp now. Ambulating with care. Feeling better.   Stable vitals. Likely demand ischemia.    -c/w asa/statin  -c/w bbl/ARB  -c/w amlodipine  - c/w steroids/nebulizer Rx, pulmonary following   -post op care  - out patient cardiology f/u, Dr. Blanchard at [facility], possibly need ischemic cardiac w/u as outpatient.

## 2018-02-12 NOTE — PROGRESS NOTE ADULT - SUBJECTIVE AND OBJECTIVE BOX
Patient is a 78y old  Female who presents with a chief complaint of Laminectomy with fusion (10 Feb 2018 22:25)      PAST MEDICAL & SURGICAL HISTORY:  Afib  S/P carotid endarterectomy  S/P CABG x 2: and AVR  Smoking history  Vaginal lesion  PVD (peripheral vascular disease) with claudication  Herniated lumbar disc without myelopathy  COPD (chronic obstructive pulmonary disease): mild  Dyslipidemia  Bladder cancer: s/o [id number]  Colon cancer: , s/p chemoRX  Hypertension: x 25  years  S/P carotid endarterectomy  S/P CABG (coronary artery bypass graft):  and AVR  History of : x 3  H/O cystoscopy: h/o TURBT x 2--2015,   H/O hemicolectomy: h/o right hemicolectomy, right salpingo-ooporectomy, partial cystectomy-2014      INTERVAL HISTORY: Resting in chair, able to ambulate around in the room, denies any chest pain or sob  	  MEDICATIONS:  MEDICATIONS  (STANDING):  ALBUTerol/ipratropium for Nebulization 3 milliLiter(s) Nebulizer every 8 hours  amLODIPine   Tablet 10 milliGRAM(s) Oral daily  aspirin enteric coated 325 milliGRAM(s) Oral daily  atorvastatin 20 milliGRAM(s) Oral <User Schedule>  buDESOnide   0.5 milliGRAM(s) Respule 0.5 milliGRAM(s) Inhalation two times a day  docusate sodium 100 milliGRAM(s) Oral three times a day  doxazosin 8 milliGRAM(s) Oral at bedtime  losartan 100 milliGRAM(s) Oral daily  metoprolol     tartrate 50 milliGRAM(s) Oral two times a day  potassium acid phosphate/sodium acid phosphate tablet (K-PHOS No. 2) 1 Tablet(s) Oral four times a day with meals  predniSONE   Tablet 20 milliGRAM(s) Oral daily  senna 2 Tablet(s) Oral at bedtime    MEDICATIONS  (PRN):  oxyCODONE    5 mG/acetaminophen 325 mG 1 Tablet(s) Oral every 4 hours PRN Moderate Pain      Vitals:  T(F): 97.2 (18 @ 12:11), Max: 98.6 (18 @ 00:02)  HR: 60 (18 @ 12:11) (58 - 88)  BP: 146/47 (18 @ 12:11) (146/47 - 179/71)  RR: 17 (18 @ 12:11) (17 - 19)  SpO2: 94% (18 @ 12:11) (88% - 98%)  Wt(kg): -- 65 kg     07:01  -   @ 07:00  --------------------------------------------------------  IN:    Oral Fluid: 300 mL  Total IN: 300 mL    OUT:  Total OUT: 0 mL    Total NET: 300 mL       @ 07:01  -   @ 13:24  --------------------------------------------------------  IN:    Oral Fluid: 360 mL  Total IN: 360 mL    OUT:  Total OUT: 0 mL    Total NET: 360 mL    PHYSICAL EXAM:  Neuro: Awake, responsive  CV: S1 S2 RRR  Lungs: CTABL  GI: Soft, BS +, ND, NT, dressing to lower back intact  Extremities: Trace LE edema      RADIOLOGY:     DIAGNOSTIC TESTING:    [x ] Echocardiogram: < from: TTE Echo Doppler w/o Cont (18 @ 19:35) >  1. Left ventricular ejection fraction, by visual estimation, is 55 to   60%.   2. Normal global left ventricular systolic function.   3. Mild mitral annular calcification.   4. Moderate mitral valve regurgitation.   5. Thickening and calcification of the anterior and posterior mitral   valve leaflets.   6. Degenerative tricuspid valve.   7. Moderate-severe tricuspid regurgitation.   8. Bioprosthesis in the aortic position.   9. Estimated pulmonary artery systolic pressure is 56.6 mmHg assuming a   right atrial pressure of 5 mmHg, which is consistent with moderate   pulmonary hypertension.    < end of copied text >    LABS:	 	    CARDIAC MARKERS:  Troponin I, Serum: 1.100 ng/mL ( @ 07:10)  Troponin I, Serum: 1.750 ng/mL (02-10 @ 07:48)      2018 07:52    142    |  107    |  23     ----------------------------<  121    4.2     |  29     |  1.01   2018 07:10    143    |  107    |  22     ----------------------------<  93     3.9     |  29     |  0.97   10 Feb 2018 07:48    144    |  109    |  29     ----------------------------<  104    3.8     |  29     |  1.08     Ca    8.6        2018 07:52                            9.8    8.17  )-----------( 205      ( 2018 07:52 )             29.4 ,                       10.5   6.96  )-----------( 199      ( 2018 07:10 )             31.1 ,                       10.2   10.79 )-----------( 175      ( 10 Feb 2018 07:48 )             30.5 Patient is a 78y old  Female who presents with a chief complaint of Laminectomy with fusion (10 Feb 2018 22:25)      PAST MEDICAL & SURGICAL HISTORY:  ?Afib  S/P carotid endarterectomy  S/P CABG x 2: and AVR  Smoking history  Vaginal lesion  PVD (peripheral vascular disease) with claudication  Herniated lumbar disc without myelopathy  COPD (chronic obstructive pulmonary disease): mild  Dyslipidemia  Bladder cancer: s/o [id number]&#[id number];13  Colon cancer: , s/p chemoRX  Hypertension: x 25  years  S/P carotid endarterectomy  S/P CABG (coronary artery bypass graft):  and AVR  History of : x 3  H/O cystoscopy: h/o TURBT x 2--2015,   H/O hemicolectomy: h/o right hemicolectomy, right salpingo-ooporectomy, partial cystectomy-2014      INTERVAL HISTORY: Resting in chair, able to ambulate around in the room, denies any chest pain or sob  	  MEDICATIONS:  MEDICATIONS  (STANDING):  ALBUTerol/ipratropium for Nebulization 3 milliLiter(s) Nebulizer every 8 hours  amLODIPine   Tablet 10 milliGRAM(s) Oral daily  aspirin enteric coated 325 milliGRAM(s) Oral daily  atorvastatin 20 milliGRAM(s) Oral <User Schedule>  buDESOnide   0.5 milliGRAM(s) Respule 0.5 milliGRAM(s) Inhalation two times a day  docusate sodium 100 milliGRAM(s) Oral three times a day  doxazosin 8 milliGRAM(s) Oral at bedtime  losartan 100 milliGRAM(s) Oral daily  metoprolol     tartrate 50 milliGRAM(s) Oral two times a day  potassium acid phosphate/sodium acid phosphate tablet (K-PHOS No. 2) 1 Tablet(s) Oral four times a day with meals  predniSONE   Tablet 20 milliGRAM(s) Oral daily  senna 2 Tablet(s) Oral at bedtime    MEDICATIONS  (PRN):  oxyCODONE    5 mG/acetaminophen 325 mG 1 Tablet(s) Oral every 4 hours PRN Moderate Pain      Vitals:  T(F): 97.2 (18 @ 12:11), Max: 98.6 (18 @ 00:02)  HR: 60 (18 @ 12:11) (58 - 88)  BP: 146/47 (18 @ 12:11) (146/47 - 179/71)  RR: 17 (18 @ 12:11) (17 - 19)  SpO2: 94% (18 @ 12:11) (88% - 98%)  Wt(kg): -- 65 kg     @ 07:01  -   @ 07:00  --------------------------------------------------------  IN:    Oral Fluid: 300 mL  Total IN: 300 mL    OUT:  Total OUT: 0 mL    Total NET: 300 mL       @ 07:01  -   @ 13:24  --------------------------------------------------------  IN:    Oral Fluid: 360 mL  Total IN: 360 mL    OUT:  Total OUT: 0 mL    Total NET: 360 mL    PHYSICAL EXAM:  Neuro: Awake, responsive  CV: S1 S2 RRR, + systolic murmur   Lungs: CTABL  GI: Soft, BS +, ND, NT, dressing to lower back intact  Extremities: Trace LE edema      RADIOLOGY: < from: Xray Chest 1 View- PORTABLE-Routine (18 @ 09:04) >  Status post CABG. Trace bilateral pleural effusions.    < end of copied text >    < from: CT Angio Chest w/ IV Cont (18 @ 14:49) >   No evidence of emboli. There are bilateral atelectatic   infiltrates in the lower lobes with adjacent mild effusions and some   lingular area atelectasis as well.    There are prominent central mediastinal and hilar lymph nodes which could   well be reactive.    There is some mucoid material in the trachea.    Clips in the lower esophageal area. Mild compression T11 likely old.    Sternotomy and prosthetic aortic valve. Incidental findings as above.    < end of copied text >    < from: US Duplex Venous Lower Ext Complete, Bilateral (02.08.18 @ 15:18) >  IMPRESSION:  No evidence of DVT.      < end of copied text >    DIAGNOSTIC TESTING:    [x ] Echocardiogram: < from: TTE Echo Doppler w/o Cont (18 @ 19:35) >  1. Left ventricular ejection fraction, by visual estimation, is 55 to   60%.   2. Normal global left ventricular systolic function.   3. Mild mitral annular calcification.   4. Moderate mitral valve regurgitation.   5. Thickening and calcification of the anterior and posterior mitral   valve leaflets.   6. Degenerative tricuspid valve.   7. Moderate-severe tricuspid regurgitation.   8. Bioprosthesis in the aortic position.   9. Estimated pulmonary artery systolic pressure is 56.6 mmHg assuming a   right atrial pressure of 5 mmHg, which is consistent with moderate   pulmonary hypertension.    < end of copied text >    LABS:	 	    CARDIAC MARKERS:  Troponin I, Serum: 1.100 ng/mL ( @ 07:10)  Troponin I, Serum: 1.750 ng/mL (02-10 @ 07:48)      2018 07:52    142    |  107    |  23     ----------------------------<  121    4.2     |  29     |  1.01   2018 07:10    143    |  107    |  22     ----------------------------<  93     3.9     |  29     |  0.97   10 Feb 2018 07:48    144    |  109    |  29     ----------------------------<  104    3.8     |  29     |  1.08     Ca    8.6        2018 07:52                            9.8    8.17  )-----------( 205      ( 2018 07:52 )             29.4 ,                       10.5   6.96  )-----------( 199      ( 2018 07:10 )             31.1 ,                       10.2   10.79 )-----------( 175      ( 10 Feb 2018 07:48 )             30.5

## 2018-02-12 NOTE — PROGRESS NOTE ADULT - PROBLEM SELECTOR PROBLEM 3
Coronary artery disease due to lipid rich plaque

## 2018-02-13 LAB
CULTURE RESULTS: SIGNIFICANT CHANGE UP
CULTURE RESULTS: SIGNIFICANT CHANGE UP
SPECIMEN SOURCE: SIGNIFICANT CHANGE UP
SPECIMEN SOURCE: SIGNIFICANT CHANGE UP

## 2018-02-14 LAB
CULTURE RESULTS: SIGNIFICANT CHANGE UP
SPECIMEN SOURCE: SIGNIFICANT CHANGE UP

## 2018-02-15 DIAGNOSIS — E87.2 ACIDOSIS: ICD-10-CM

## 2018-02-15 DIAGNOSIS — I50.9 HEART FAILURE, UNSPECIFIED: ICD-10-CM

## 2018-02-15 DIAGNOSIS — Z95.4 PRESENCE OF OTHER HEART-VALVE REPLACEMENT: ICD-10-CM

## 2018-02-15 DIAGNOSIS — Z95.1 PRESENCE OF AORTOCORONARY BYPASS GRAFT: ICD-10-CM

## 2018-02-15 DIAGNOSIS — Z86.79 PERSONAL HISTORY OF OTHER DISEASES OF THE CIRCULATORY SYSTEM: ICD-10-CM

## 2018-02-15 DIAGNOSIS — Z72.89 OTHER PROBLEMS RELATED TO LIFESTYLE: ICD-10-CM

## 2018-02-15 DIAGNOSIS — Z87.891 PERSONAL HISTORY OF NICOTINE DEPENDENCE: ICD-10-CM

## 2018-02-15 DIAGNOSIS — I11.0 HYPERTENSIVE HEART DISEASE WITH HEART FAILURE: ICD-10-CM

## 2018-02-15 DIAGNOSIS — J96.02 ACUTE RESPIRATORY FAILURE WITH HYPERCAPNIA: ICD-10-CM

## 2018-02-15 DIAGNOSIS — J96.01 ACUTE RESPIRATORY FAILURE WITH HYPOXIA: ICD-10-CM

## 2018-02-15 DIAGNOSIS — Z85.038 PERSONAL HISTORY OF OTHER MALIGNANT NEOPLASM OF LARGE INTESTINE: ICD-10-CM

## 2018-02-15 DIAGNOSIS — E78.5 HYPERLIPIDEMIA, UNSPECIFIED: ICD-10-CM

## 2018-02-15 DIAGNOSIS — Z92.21 PERSONAL HISTORY OF ANTINEOPLASTIC CHEMOTHERAPY: ICD-10-CM

## 2018-02-15 DIAGNOSIS — R01.1 CARDIAC MURMUR, UNSPECIFIED: ICD-10-CM

## 2018-02-15 DIAGNOSIS — I25.10 ATHEROSCLEROTIC HEART DISEASE OF NATIVE CORONARY ARTERY WITHOUT ANGINA PECTORIS: ICD-10-CM

## 2018-02-15 DIAGNOSIS — M54.16 RADICULOPATHY, LUMBAR REGION: ICD-10-CM

## 2018-02-15 DIAGNOSIS — Z85.51 PERSONAL HISTORY OF MALIGNANT NEOPLASM OF BLADDER: ICD-10-CM

## 2018-02-15 DIAGNOSIS — I24.8 OTHER FORMS OF ACUTE ISCHEMIC HEART DISEASE: ICD-10-CM

## 2018-02-15 DIAGNOSIS — I73.9 PERIPHERAL VASCULAR DISEASE, UNSPECIFIED: ICD-10-CM

## 2018-02-15 DIAGNOSIS — M48.062 SPINAL STENOSIS, LUMBAR REGION WITH NEUROGENIC CLAUDICATION: ICD-10-CM

## 2018-02-15 DIAGNOSIS — I25.83 CORONARY ATHEROSCLEROSIS DUE TO LIPID RICH PLAQUE: ICD-10-CM

## 2018-02-15 DIAGNOSIS — J44.9 CHRONIC OBSTRUCTIVE PULMONARY DISEASE, UNSPECIFIED: ICD-10-CM

## 2018-04-02 ENCOUNTER — APPOINTMENT (OUTPATIENT)
Dept: UROLOGY | Facility: CLINIC | Age: 78
End: 2018-04-02

## 2018-04-04 ENCOUNTER — APPOINTMENT (OUTPATIENT)
Dept: UROLOGY | Facility: CLINIC | Age: 78
End: 2018-04-04

## 2018-04-16 ENCOUNTER — APPOINTMENT (OUTPATIENT)
Dept: UROLOGY | Facility: CLINIC | Age: 78
End: 2018-04-16
Payer: MEDICARE

## 2018-04-16 ENCOUNTER — OUTPATIENT (OUTPATIENT)
Dept: OUTPATIENT SERVICES | Facility: HOSPITAL | Age: 78
LOS: 1 days | End: 2018-04-16
Payer: MEDICARE

## 2018-04-16 VITALS
SYSTOLIC BLOOD PRESSURE: 162 MMHG | HEART RATE: 71 BPM | DIASTOLIC BLOOD PRESSURE: 55 MMHG | TEMPERATURE: 97.7 F | RESPIRATION RATE: 17 BRPM

## 2018-04-16 DIAGNOSIS — Z95.1 PRESENCE OF AORTOCORONARY BYPASS GRAFT: Chronic | ICD-10-CM

## 2018-04-16 DIAGNOSIS — Z98.890 OTHER SPECIFIED POSTPROCEDURAL STATES: Chronic | ICD-10-CM

## 2018-04-16 PROCEDURE — 52000 CYSTOURETHROSCOPY: CPT

## 2018-04-19 DIAGNOSIS — C67.9 MALIGNANT NEOPLASM OF BLADDER, UNSPECIFIED: ICD-10-CM

## 2018-04-20 LAB — CORE LAB FLUID CYTOLOGY: NORMAL

## 2018-04-23 ENCOUNTER — APPOINTMENT (OUTPATIENT)
Dept: UROLOGY | Facility: CLINIC | Age: 78
End: 2018-04-23

## 2018-04-25 ENCOUNTER — APPOINTMENT (OUTPATIENT)
Dept: GYNECOLOGIC ONCOLOGY | Facility: CLINIC | Age: 78
End: 2018-04-25
Payer: MEDICARE

## 2018-04-25 VITALS
BODY MASS INDEX: 27.29 KG/M2 | DIASTOLIC BLOOD PRESSURE: 74 MMHG | WEIGHT: 130 LBS | HEIGHT: 58 IN | SYSTOLIC BLOOD PRESSURE: 132 MMHG

## 2018-04-25 PROCEDURE — 99213 OFFICE O/P EST LOW 20 MIN: CPT

## 2018-12-18 NOTE — ASU PATIENT PROFILE, ADULT - NS SC CAGE ALCOHOL ANNOYED YOU
EMERGENCY DEPARTMENT ENCOUNTER    CHIEF COMPLAINT  Chief Complaint: hematuria  History given by: pt  History limited by: none  Room Number: 25/25  PMD: Divine Bruno MD      HPI:  Pt is a 64 y.o. male who presents complaining of hematuria with clots and urinary frequency that has gotten progressively worse and started a week ago. Pt admits to lower abdominal pain this morning with nausea and vomiting. Pt reports that he saw his PCP who did a culture and set the pt up for a kidney US.    Duration:  A week  Onset: gradual  Timing: intermittent  Quality: hematuria  Intensity/Severity: moderate  Progression: progressively worse  Associated Symptoms: lower abdominal pain, nausea, vomiting, urinary frequency  Aggravating Factors: none  Alleviating Factors: none  Previous Episodes: none  Treatment before arrival: none    PAST MEDICAL HISTORY  Active Ambulatory Problems     Diagnosis Date Noted   • Pharyngeal dysphagia 12/13/2018   • Osteophyte of cervical spine 12/13/2018     Resolved Ambulatory Problems     Diagnosis Date Noted   • No Resolved Ambulatory Problems     Past Medical History:   Diagnosis Date   • Cancer (CMS/HCC)    • Enlarged prostate    • GERD (gastroesophageal reflux disease)    • Gout    • Gout 1988   • Hyperlipidemia    • Hypertension        PAST SURGICAL HISTORY  Past Surgical History:   Procedure Laterality Date   • BUNIONECTOMY     • CARPAL TUNNEL RELEASE Bilateral 1988   • FOOT SURGERY  2006   • PAROTIDECTOMY Left 1992    NO COMPLICATIONS   • REPLACEMENT TOTAL KNEE Left 2008   • ROTATOR CUFF REPAIR Left 1992    NO COMPLICATIONS   • ROTATOR CUFF REPAIR Right 2011       FAMILY HISTORY  Family History   Problem Relation Age of Onset   • Emphysema Mother    • Lung cancer Father        SOCIAL HISTORY  Social History     Socioeconomic History   • Marital status:      Spouse name: Not on file   • Number of children: 1   • Years of education: 16   • Highest education level: Associate degree:  academic program   Social Needs   • Financial resource strain: Patient refused   • Food insecurity - worry: Patient refused   • Food insecurity - inability: Patient refused   • Transportation needs - medical: Patient refused   • Transportation needs - non-medical: Patient refused   Occupational History   • Occupation: RETIRED   Tobacco Use   • Smoking status: Never Smoker   • Smokeless tobacco: Never Used   Substance and Sexual Activity   • Alcohol use: Yes     Frequency: Never     Comment: socially   • Drug use: No   • Sexual activity: Defer   Other Topics Concern   • Not on file   Social History Narrative    LIVES WITH SPOUSE       ALLERGIES  Patient has no known allergies.    REVIEW OF SYSTEMS  Review of Systems   Constitutional: Negative for activity change, appetite change and fever.   HENT: Negative for congestion and sore throat.    Eyes: Negative.    Respiratory: Negative for cough and shortness of breath.    Cardiovascular: Negative for chest pain and leg swelling.   Gastrointestinal: Positive for abdominal pain, nausea and vomiting. Negative for diarrhea.   Endocrine: Negative.    Genitourinary: Positive for frequency and hematuria. Negative for decreased urine volume and dysuria.   Musculoskeletal: Negative for neck pain.   Skin: Negative for rash and wound.   Allergic/Immunologic: Negative.    Neurological: Negative for weakness, numbness and headaches.   Hematological: Negative.    Psychiatric/Behavioral: Negative.    All other systems reviewed and are negative.      PHYSICAL EXAM  ED Triage Vitals [12/18/18 1008]   Temp Heart Rate Resp BP SpO2   98.2 °F (36.8 °C) 76 16 -- 97 %      Temp src Heart Rate Source Patient Position BP Location FiO2 (%)   Tympanic Monitor -- -- --       Physical Exam   Constitutional: He is oriented to person, place, and time. He appears distressed (appears uncomfortable).   HENT:   Head: Normocephalic and atraumatic.   Eyes: EOM are normal. Pupils are equal, round, and  reactive to light.   Neck: Normal range of motion. Neck supple.   Cardiovascular: Normal rate, regular rhythm and normal heart sounds.   Pulmonary/Chest: Effort normal and breath sounds normal. No respiratory distress.   Abdominal: Soft. There is no tenderness. There is no rebound and no guarding.   Musculoskeletal: Normal range of motion. He exhibits no edema.   Neurological: He is alert and oriented to person, place, and time. He has normal sensation and normal strength.   Skin: Skin is warm and dry.   Psychiatric: Mood and affect normal.   Nursing note and vitals reviewed.      LAB RESULTS  Lab Results (last 24 hours)     Procedure Component Value Units Date/Time    Urinalysis With Culture If Indicated - Urine, Clean Catch [[id number]]  (Abnormal) Collected:  12/18/18 1022    Specimen:  Urine, Clean Catch Updated:  12/18/18 1111     Color, UA Other     Comment: Color = PINK        Appearance, UA Cloudy     pH, UA 6.5     Specific Gravity, UA 1.019     Glucose, UA Negative     Ketones, UA Negative     Bilirubin, UA Negative     Blood, UA Large (3+)     Protein, UA Negative     Leuk Esterase, UA Trace     Nitrite, UA Negative     Urobilinogen, UA 0.2 E.U./dL    Urinalysis, Microscopic Only - Urine, Clean Catch [[id number]]  (Abnormal) Collected:  12/18/18 1022    Specimen:  Urine, Clean Catch Updated:  12/18/18 1123     RBC, UA Too Numerous to Count /HPF      WBC, UA       Unable to determine due to loaded field     /HPF     Bacteria, UA       Unable to determine due to loaded field     /HPF     Squamous Epithelial Cells, UA       Unable to determine due to loaded field     /HPF     Hyaline Casts, UA       Unable to determine due to loaded field     /LPF     Methodology Manual Light Microscopy    Urine Culture - Urine, Urine, Clean Catch [[id number]] Collected:  12/18/18 1022    Specimen:  Urine, Clean Catch Updated:  12/18/18 1123    CBC & Differential [[id number]] Collected:  12/18/18 1032    Specimen:  Blood Updated:   12/18/18 1105    Narrative:       The following orders were created for panel order CBC & Differential.  Procedure                               Abnormality         Status                     ---------                               -----------         ------                     CBC Auto Differential[[id number]]         Abnormal            Final result                 Please view results for these tests on the individual orders.    Comprehensive Metabolic Panel [[id number]]  (Abnormal) Collected:  12/18/18 1032    Specimen:  Blood Updated:  12/18/18 1116     Glucose 110 mg/dL      BUN 16 mg/dL      Creatinine 1.10 mg/dL      Sodium 139 mmol/L      Potassium 4.3 mmol/L      Chloride 103 mmol/L      CO2 26.4 mmol/L      Calcium 9.3 mg/dL      Total Protein 7.4 g/dL      Albumin 4.30 g/dL      ALT (SGPT) 24 U/L      AST (SGOT) 21 U/L      Alkaline Phosphatase 112 U/L      Total Bilirubin 0.6 mg/dL      eGFR Non African Amer 67 mL/min/1.73      Globulin 3.1 gm/dL      A/G Ratio 1.4 g/dL      BUN/Creatinine Ratio 14.5     Anion Gap 9.6 mmol/L     CBC Auto Differential [[id number]]  (Abnormal) Collected:  12/18/18 1032    Specimen:  Blood Updated:  12/18/18 1105     WBC 7.93 10*3/mm3      RBC 4.65 10*6/mm3      Hemoglobin 14.0 g/dL      Hematocrit 40.8 %      MCV 87.7 fL      MCH 30.1 pg      MCHC 34.3 g/dL      RDW 13.5 %      RDW-SD 43.5 fl      MPV 10.6 fL      Platelets 173 10*3/mm3      Neutrophil % 80.6 %      Lymphocyte % 14.4 %      Monocyte % 3.9 %      Eosinophil % 1.0 %      Basophil % 0.1 %      Immature Grans % 0.4 %      Neutrophils, Absolute 6.39 10*3/mm3      Lymphocytes, Absolute 1.14 10*3/mm3      Monocytes, Absolute 0.31 10*3/mm3      Eosinophils, Absolute 0.08 10*3/mm3      Basophils, Absolute 0.01 10*3/mm3      Immature Grans, Absolute 0.03 10*3/mm3           I ordered the above labs and reviewed the results    RADIOLOGY  CT Abdomen Pelvis Without Contrast       2 large left renal masses suspicious for renal  cell carcinoma with blood in the collecting system.        I ordered the above noted radiological studies. Interpreted by radiologist. Discussed with radiologist (Dr Rodriguez). Reviewed by me in PACS.       PROCEDURES  Procedures      PROGRESS AND CONSULTS       1018  Ordered labs and abd/pel CT for further viewing. Ordered IV fluids, dilaudid and toradol for pain, and zofran for nausea.    1059  Ordered urinalysis for further viewing.    1106  Rechecked patient who is resting. Discussed all lab and test results. Discussed plan to call urology. Pt understands and agrees with the plan. All questions have been answered.  Ordered call from Urology.    1123  Ordered urine culture for further viewing.    1212  Discussed case with Dr Call, Urology  Reviewed history, exam, results and treatments.  Discussed concerns and plan of care.   Dr Call recommends discharging the pt and having the pt follow up in office this week.    1214  Rechecked patient who is restin. Discussed call with Dr Call. Discussed plan to discharge the pt with pain medication and for follow up later this week with urologist. Discussed that pt needs to RTER for urinary retention. Pt understands and agrees with the plan. All questions have been answered.        MEDICAL DECISION MAKING  Results were reviewed/discussed with the patient and they were also made aware of online access. Pt also made aware that some labs, such as cultures, will not be resulted during ER visit and follow up with PMD is necessary.     MDM  Number of Diagnoses or Management Options  Hematuria, unspecified type:   Renal mass of left transplanted kidney:      Amount and/or Complexity of Data Reviewed  Clinical lab tests: reviewed and ordered (UA: too numerous to count RBCs)  Tests in the radiology section of CPT®: reviewed and ordered (Abd/pel CT - 2 large left renal masses suspicious for renal cell carcinoma with blood in the collecting system.)  Discussion of  test results with the performing providers: yes (Dr Rodriguez)  Decide to obtain previous medical records or to obtain history from someone other than the patient: yes (EPIC)  Discuss the patient with other providers: yes (Dr Call)    Patient Progress  Patient progress: stable         DIAGNOSIS  Final diagnoses:   Renal mass of left transplanted kidney   Hematuria, unspecified type       DISPOSITION  DISCHARGE    Patient discharged in stable condition.    Reviewed implications of results, diagnosis, meds, responsibility to follow up, warning signs and symptoms of possible worsening, potential complications and reasons to return to ER, including urinary retention.    Patient/Family voiced understanding of above instructions.    Discussed plan for discharge, as there is no emergent indication for admission. Patient referred to primary care provider for BP management due to today's BP. Pt/family is agreeable and understands need for follow up and repeat testing.  Pt is aware that discharge does not mean that nothing is wrong but it indicates no emergency is present that requires admission and they must continue care with follow-up as given below or physician of their choice.     FOLLOW-UP  Ace Call MD  [address] [address]  [phone]    Schedule an appointment as soon as possible for a visit            Medication List      New Prescriptions    HYDROcodone-acetaminophen 5-325 MG per tablet  Commonly known as:  NORCO  Take 1 tablet by mouth Every 6 (Six) Hours As Needed for Moderate Pain .     ondansetron ODT 4 MG disintegrating tablet  Commonly known as:  ZOFRAN-ODT  Take 1 tablet by mouth Every 6 (Six) Hours As Needed for Nausea or   Vomiting.              Latest Documented Vital Signs:  As of 12:21 PM  BP- 144/83 HR- 64 Temp- 98.2 °F (36.8 °C) (Tympanic) O2 sat- 97%    --  Documentation assistance provided by venkat Baltazar for Dr Hendricks.  Information recorded by the  scribe was done at my direction and has been verified and validated by me.         Liat Baltazar  12/18/18 1221       Nuno Hendricks MD  12/18/18 3048     no

## 2019-01-03 PROBLEM — I48.91 UNSPECIFIED ATRIAL FIBRILLATION: Chronic | Status: ACTIVE | Noted: 2018-01-29

## 2019-01-03 PROBLEM — Z95.1 PRESENCE OF AORTOCORONARY BYPASS GRAFT: Chronic | Status: ACTIVE | Noted: 2018-01-29

## 2019-01-03 PROBLEM — Z98.890 OTHER SPECIFIED POSTPROCEDURAL STATES: Chronic | Status: ACTIVE | Noted: 2018-01-29

## 2019-04-02 ENCOUNTER — MESSAGE (OUTPATIENT)
Age: 79
End: 2019-04-02

## 2019-04-27 ENCOUNTER — MESSAGE (OUTPATIENT)
Age: 79
End: 2019-04-27

## 2019-05-13 ENCOUNTER — OUTPATIENT (OUTPATIENT)
Dept: OUTPATIENT SERVICES | Facility: HOSPITAL | Age: 79
LOS: 1 days | End: 2019-05-13
Payer: MEDICARE

## 2019-05-13 ENCOUNTER — APPOINTMENT (OUTPATIENT)
Dept: UROLOGY | Facility: CLINIC | Age: 79
End: 2019-05-13
Payer: MEDICARE

## 2019-05-13 ENCOUNTER — APPOINTMENT (OUTPATIENT)
Dept: UROLOGY | Facility: CLINIC | Age: 79
End: 2019-05-13

## 2019-05-13 VITALS
SYSTOLIC BLOOD PRESSURE: 144 MMHG | TEMPERATURE: 97.8 F | DIASTOLIC BLOOD PRESSURE: 55 MMHG | HEART RATE: 69 BPM | RESPIRATION RATE: 17 BRPM

## 2019-05-13 DIAGNOSIS — Z95.1 PRESENCE OF AORTOCORONARY BYPASS GRAFT: Chronic | ICD-10-CM

## 2019-05-13 DIAGNOSIS — R35.0 FREQUENCY OF MICTURITION: ICD-10-CM

## 2019-05-13 DIAGNOSIS — Z98.890 OTHER SPECIFIED POSTPROCEDURAL STATES: Chronic | ICD-10-CM

## 2019-05-13 PROCEDURE — 52000 CYSTOURETHROSCOPY: CPT

## 2019-05-13 PROCEDURE — 99214 OFFICE O/P EST MOD 30 MIN: CPT | Mod: 25

## 2019-05-14 NOTE — PHYSICAL EXAM
[General Appearance - Well Developed] : well developed [General Appearance - Well Nourished] : well nourished [General Appearance - In No Acute Distress] : no acute distress [Abdomen Soft] : soft [Abdomen Tenderness] : non-tender [Urethral Meatus] : the meatus of the urethra showed no abnormalities [Exaggerated Use Of Accessory Muscles For Inspiration] : no accessory muscle use [Oriented To Time, Place, And Person] : oriented to person, place, and time [FreeTextEntry1] : palpable nodes in L inguinal region, minimally mobile

## 2019-05-14 NOTE — ASSESSMENT
[FreeTextEntry1] : RP and L inguinal enlarging nodes. Given location with largest nodes in L inguinal region, TCC/small cell less likely. Cysto negative. \par --F/u with Dr Redding\par --refer to Dr Zarate\par --cyto today

## 2019-05-14 NOTE — HISTORY OF PRESENT ILLNESS
[FreeTextEntry1] : 78yo female with cc of bladder ca and LAD. Pt originally seen by me 4y ago after 2013 dx of bladder ca with mixed histology but predominantly small cell. She was seen at [facility] and had CT with evidence of hilar LAD and ? of cecal mass. She had re-TUR with path shoing HG T1 TCC. PET showed bladder and colon masses and colonoscopy was perfomed with bx showing cecal adeno ca. She had chemo 2/14-5/15 with etoposide and cisplatin x4 and then eventually had R hemicolectomy, R salpingo-oophrectomy, and partial cystectomy in 7/2014 with LND. No tumor in final path on bladder, nodes were negative. She had CT 10/2014 that showed post surgical changes, negative cysto but positive cytology. On subsequent surveillance, she was found to have a papillary tumor in L dome (away from sugical site) surrounded by patchy erythema suggestive of CIS. She underwent TURBT (Dr Arreola) 3/19/15. Path revealed CIS. She was placed on maintenance BCG and has had negative surveillance cystos since. On surveillance, noted to have vulvar lesion and was sent to gyn onc in 2016. dx with vulvar SCC in 2017. Followed by Dr Redding. \par \par She has also been getting periodic abd imaging. CT last year was negative. Recent CT shows new L inguinal lymoh nodes up to 3cm. Pt reports palpating them over the last month or so. No associated pain. There was also new RP and L pelvic nodes. \par \par Had mini stroke in Feb. Has speech impediments as a result

## 2019-05-17 ENCOUNTER — APPOINTMENT (OUTPATIENT)
Dept: GYNECOLOGIC ONCOLOGY | Facility: CLINIC | Age: 79
End: 2019-05-17
Payer: MEDICARE

## 2019-05-17 VITALS
HEIGHT: 58.5 IN | BODY MASS INDEX: 25.95 KG/M2 | SYSTOLIC BLOOD PRESSURE: 145 MMHG | WEIGHT: 127 LBS | DIASTOLIC BLOOD PRESSURE: 63 MMHG

## 2019-05-17 LAB
APTT BLD: 30.9 SEC
BASOPHILS # BLD AUTO: 0.03 K/UL
BASOPHILS NFR BLD AUTO: 0.4 %
EOSINOPHIL # BLD AUTO: 0.14 K/UL
EOSINOPHIL NFR BLD AUTO: 1.7 %
HCT VFR BLD CALC: 35 %
HGB BLD-MCNC: 10.8 G/DL
IMM GRANULOCYTES NFR BLD AUTO: 0.2 %
INR PPP: 1.07 RATIO
LYMPHOCYTES # BLD AUTO: 0.97 K/UL
LYMPHOCYTES NFR BLD AUTO: 11.6 %
MAN DIFF?: NORMAL
MCHC RBC-ENTMCNC: 27.7 PG
MCHC RBC-ENTMCNC: 30.9 GM/DL
MCV RBC AUTO: 89.7 FL
MONOCYTES # BLD AUTO: 1.23 K/UL
MONOCYTES NFR BLD AUTO: 14.7 %
NEUTROPHILS # BLD AUTO: 5.97 K/UL
NEUTROPHILS NFR BLD AUTO: 71.4 %
PLATELET # BLD AUTO: 311 K/UL
PT BLD: 12.1 SEC
RBC # BLD: 3.9 M/UL
RBC # FLD: 14.9 %
URINE CYTOLOGY: NORMAL
WBC # FLD AUTO: 8.36 K/UL

## 2019-05-17 PROCEDURE — 99214 OFFICE O/P EST MOD 30 MIN: CPT

## 2019-05-17 NOTE — HISTORY OF PRESENT ILLNESS
[FreeTextEntry1] : STAGE IA SCCA vulva/vagina (microscopic)\par WLE of the posterior left vulva 1/1/17.\par \par She has a history of bladder and colon cancers. \par \par Returns today after a recent CT scan demonstrates extensive adenopathy in left groin, pelvis and paraortic regions. \par \par Recent cystoscopy with Dr. Espinoza was normal (5/2019). \par

## 2019-05-17 NOTE — PHYSICAL EXAM
[Normal] : Anus and perineum: Normal sphincter tone, no masses, no prolapse. [Fully active, able to carry on all pre-disease performance without restriction] : Status 0 - Fully active, able to carry on all pre-disease performance without restriction [Abnormal] : Palpation of lymph nodes in groin: Abnormal [de-identified] : midline vertical scar with radiation tattoo marks [de-identified] : fixed adenopathy in the left groin

## 2019-05-21 DIAGNOSIS — C67.9 MALIGNANT NEOPLASM OF BLADDER, UNSPECIFIED: ICD-10-CM

## 2019-05-21 DIAGNOSIS — R59.0 LOCALIZED ENLARGED LYMPH NODES: ICD-10-CM

## 2019-05-30 ENCOUNTER — FORM ENCOUNTER (OUTPATIENT)
Age: 79
End: 2019-05-30

## 2019-05-31 ENCOUNTER — RESULT REVIEW (OUTPATIENT)
Age: 79
End: 2019-05-31

## 2019-05-31 ENCOUNTER — OUTPATIENT (OUTPATIENT)
Dept: OUTPATIENT SERVICES | Facility: HOSPITAL | Age: 79
LOS: 1 days | End: 2019-05-31
Payer: MEDICARE

## 2019-05-31 ENCOUNTER — APPOINTMENT (OUTPATIENT)
Dept: ULTRASOUND IMAGING | Facility: IMAGING CENTER | Age: 79
End: 2019-05-31
Payer: MEDICARE

## 2019-05-31 DIAGNOSIS — Z85.51 PERSONAL HISTORY OF MALIGNANT NEOPLASM OF BLADDER: ICD-10-CM

## 2019-05-31 DIAGNOSIS — C67.9 MALIGNANT NEOPLASM OF BLADDER, UNSPECIFIED: ICD-10-CM

## 2019-05-31 DIAGNOSIS — C77.4 SECONDARY AND UNSPECIFIED MALIGNANT NEOPLASM OF INGUINAL AND LOWER LIMB LYMPH NODES: ICD-10-CM

## 2019-05-31 DIAGNOSIS — R59.0 LOCALIZED ENLARGED LYMPH NODES: ICD-10-CM

## 2019-05-31 DIAGNOSIS — C18.0 MALIGNANT NEOPLASM OF CECUM: ICD-10-CM

## 2019-05-31 DIAGNOSIS — Z98.890 OTHER SPECIFIED POSTPROCEDURAL STATES: Chronic | ICD-10-CM

## 2019-05-31 DIAGNOSIS — Z95.1 PRESENCE OF AORTOCORONARY BYPASS GRAFT: Chronic | ICD-10-CM

## 2019-05-31 DIAGNOSIS — C51.9 MALIGNANT NEOPLASM OF VULVA, UNSPECIFIED: ICD-10-CM

## 2019-05-31 PROCEDURE — 88342 IMHCHEM/IMCYTCHM 1ST ANTB: CPT | Mod: 26,59

## 2019-05-31 PROCEDURE — 88305 TISSUE EXAM BY PATHOLOGIST: CPT

## 2019-05-31 PROCEDURE — 76942 ECHO GUIDE FOR BIOPSY: CPT | Mod: 26

## 2019-05-31 PROCEDURE — 88341 IMHCHEM/IMCYTCHM EA ADD ANTB: CPT

## 2019-05-31 PROCEDURE — 20206 BIOPSY MUSCLE PERQ NEEDLE: CPT

## 2019-05-31 PROCEDURE — 76942 ECHO GUIDE FOR BIOPSY: CPT

## 2019-05-31 PROCEDURE — 88360 TUMOR IMMUNOHISTOCHEM/MANUAL: CPT | Mod: 26,59

## 2019-05-31 PROCEDURE — 88173 CYTOPATH EVAL FNA REPORT: CPT

## 2019-05-31 PROCEDURE — 88173 CYTOPATH EVAL FNA REPORT: CPT | Mod: 26

## 2019-05-31 PROCEDURE — 88342 IMHCHEM/IMCYTCHM 1ST ANTB: CPT

## 2019-05-31 PROCEDURE — 88172 CYTP DX EVAL FNA 1ST EA SITE: CPT

## 2019-05-31 PROCEDURE — 38505 NEEDLE BIOPSY LYMPH NODES: CPT | Mod: LT

## 2019-05-31 PROCEDURE — 88305 TISSUE EXAM BY PATHOLOGIST: CPT | Mod: 26

## 2019-05-31 PROCEDURE — 88341 IMHCHEM/IMCYTCHM EA ADD ANTB: CPT | Mod: 26,59

## 2019-05-31 PROCEDURE — 88360 TUMOR IMMUNOHISTOCHEM/MANUAL: CPT

## 2019-06-03 LAB — NON-GYNECOLOGICAL CYTOLOGY STUDY: SIGNIFICANT CHANGE UP

## 2019-06-10 ENCOUNTER — OUTPATIENT (OUTPATIENT)
Dept: OUTPATIENT SERVICES | Facility: HOSPITAL | Age: 79
LOS: 1 days | Discharge: HOME | End: 2019-06-10

## 2019-06-10 DIAGNOSIS — Z95.1 PRESENCE OF AORTOCORONARY BYPASS GRAFT: Chronic | ICD-10-CM

## 2019-06-10 DIAGNOSIS — Z98.890 OTHER SPECIFIED POSTPROCEDURAL STATES: Chronic | ICD-10-CM

## 2019-06-12 ENCOUNTER — LABORATORY RESULT (OUTPATIENT)
Age: 79
End: 2019-06-12

## 2019-06-12 ENCOUNTER — APPOINTMENT (OUTPATIENT)
Dept: HEMATOLOGY ONCOLOGY | Facility: CLINIC | Age: 79
End: 2019-06-12
Payer: MEDICARE

## 2019-06-12 ENCOUNTER — RESULT REVIEW (OUTPATIENT)
Age: 79
End: 2019-06-12

## 2019-06-12 VITALS
OXYGEN SATURATION: 94 % | HEART RATE: 65 BPM | TEMPERATURE: 97.7 F | DIASTOLIC BLOOD PRESSURE: 61 MMHG | WEIGHT: 126.76 LBS | RESPIRATION RATE: 16 BRPM | SYSTOLIC BLOOD PRESSURE: 155 MMHG | BODY MASS INDEX: 26.04 KG/M2

## 2019-06-12 DIAGNOSIS — I63.9 CEREBRAL INFARCTION, UNSPECIFIED: ICD-10-CM

## 2019-06-12 DIAGNOSIS — I73.9 PERIPHERAL VASCULAR DISEASE, UNSPECIFIED: ICD-10-CM

## 2019-06-12 DIAGNOSIS — I10 ESSENTIAL (PRIMARY) HYPERTENSION: ICD-10-CM

## 2019-06-12 DIAGNOSIS — Z60.2 PROBLEMS RELATED TO LIVING ALONE: ICD-10-CM

## 2019-06-12 LAB
HCT VFR BLD CALC: 35.2 % — SIGNIFICANT CHANGE UP (ref 34.5–45)
HGB BLD-MCNC: 11.6 G/DL — SIGNIFICANT CHANGE UP (ref 11.5–15.5)
MCHC RBC-ENTMCNC: 28.2 PG — SIGNIFICANT CHANGE UP (ref 27–34)
MCHC RBC-ENTMCNC: 32.9 G/DL — SIGNIFICANT CHANGE UP (ref 32–36)
MCV RBC AUTO: 85.9 FL — SIGNIFICANT CHANGE UP (ref 80–100)
PLATELET # BLD AUTO: 327 K/UL — SIGNIFICANT CHANGE UP (ref 150–400)
RBC # BLD: 4.1 M/UL — SIGNIFICANT CHANGE UP (ref 3.8–5.2)
RBC # FLD: 13.5 % — SIGNIFICANT CHANGE UP (ref 10.3–14.5)
WBC # BLD: 9.9 K/UL — SIGNIFICANT CHANGE UP (ref 3.8–10.5)
WBC # FLD AUTO: 9.9 K/UL — SIGNIFICANT CHANGE UP (ref 3.8–10.5)

## 2019-06-12 PROCEDURE — 99205 OFFICE O/P NEW HI 60 MIN: CPT

## 2019-06-12 RX ORDER — FUROSEMIDE 40 MG/1
40 TABLET ORAL
Refills: 0 | Status: ACTIVE | COMMUNITY

## 2019-06-12 RX ORDER — OXYCODONE 5 MG/1
5 TABLET ORAL
Refills: 0 | Status: ACTIVE | COMMUNITY

## 2019-06-12 RX ORDER — LOSARTAN POTASSIUM 50 MG/1
50 TABLET, FILM COATED ORAL
Refills: 0 | Status: ACTIVE | COMMUNITY

## 2019-06-12 RX ORDER — METOPROLOL TARTRATE 25 MG/1
25 TABLET, FILM COATED ORAL
Refills: 0 | Status: DISCONTINUED | COMMUNITY
End: 2019-06-12

## 2019-06-12 SDOH — SOCIAL STABILITY - SOCIAL INSECURITY: PROBLEMS RELATED TO LIVING ALONE: Z60.2

## 2019-06-12 NOTE — CONSULT LETTER
[Dear  ___] : Dear  [unfilled], [Please see my note below.] : Please see my note below. [Consult Letter:] : I had the pleasure of evaluating your patient, [unfilled]. [DrPhilip  ___] : Dr. ESCALANTE [Sincerely,] : Sincerely, [Consult Closing:] : Thank you very much for allowing me to participate in the care of this patient.  If you have any questions, please do not hesitate to contact me.

## 2019-06-25 NOTE — HISTORY OF PRESENT ILLNESS
[de-identified] : Ms. Walton is seen in the office today in consultation. She was accompanied by her daughter. She was diagnosed with bladder cancer in late 2013. She does not recall having gross hematuria or major urinary symptoms at that time. Record review from [facility] included multiple pathology reports. A cystoscopy was performed in January of 2014 by Dr. 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 She apparently had repeat resection, as other notes indicate that she had muscle invasive disease. In the note from December 1, 2014 from Dr. 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 She received 4 cycles of etoposide and cisplatin and underwent partial cystectomy and pelvic lymph node dissection along with a right salpingo-oophorectomy and right hemicolectomy. The bladder pathology revealed no evidence of residual disease. Cecal pathology was stage I. She is extended intubation after surgery for respiratory failure and underlying COPD.\par \par CT scan in January 2014 revealed left hilar adenopathy along with enhancement of the anterior superior bladder wall. There was fullness in the cecum at that time. A bone scan showed no evidence of disease. A cystoscopy was performed on January 27, 2014 revealing a 3-4 cm mass invading the abdomen anterior wall of the bladder. The note says it clinically had muscle invasion. On July 23, 2014 she underwent partial cystectomy. 2 lymph nodes removed and both were benign.\par \par In initial consultation from Onesimo Wick on February 11, 2014 is available. His note indicates it is a clinical T2 small cell carcinoma bladder. Imaging from prior to her treatment revealed right tracheal adenopathy measuring 1.7 x 1 cm in the left hilar lymph node measuring 1.3 x 1.27 L. His note indicates that there was no clear evidence of muscle invasion, but clinically it was muscle invasive. She received carboplatin at an AUC of 5 and etoposide. 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 She recently developed edema of the left lower extremity. He was initially distal and she went to see her vascular surgeon. She was told to wear support hose. The swelling and increased up to the upper leg. At some point a CT scan was performed revealing the presence of left inguinal adenopathy. A biopsy was then performed, and the results are embedded below.\par \par The lymph node biopsy was performed in early June 2019. This revealed positivity for malignant cells indicating metastatic carcinoma. Immunohistochemical stains revealed positivity for P 63, P 40, and CK 7. Negative studies included CK 20, MYESHA- 3.\par \par She reports no pains. She continues to have left lower extremity edema. Her appetite is good, and there is no weight loss. There were no major respiratory complaints. No blood in the urine.

## 2019-06-25 NOTE — REASON FOR VISIT
[Follow-Up Visit] : a follow-up [Family Member] : family member [FreeTextEntry2] : recurrent malignancy

## 2019-06-25 NOTE — CONSULT LETTER
[Please see my note below.] : Please see my note below. [Dear  ___] : Dear  [unfilled], [Courtesy Letter:] : I had the pleasure of seeing your patient, [unfilled], in my office today. [DrPhilip  ___] : Dr. ESCALANTE [Consult Closing:] : Thank you very much for allowing me to participate in the care of this patient.  If you have any questions, please do not hesitate to contact me. [Sincerely,] : Sincerely,

## 2019-06-26 ENCOUNTER — APPOINTMENT (OUTPATIENT)
Dept: HEMATOLOGY ONCOLOGY | Facility: CLINIC | Age: 79
End: 2019-06-26

## 2019-07-01 DIAGNOSIS — C67.9 MALIGNANT NEOPLASM OF BLADDER, UNSPECIFIED: ICD-10-CM

## 2019-07-01 NOTE — PHYSICAL EXAM
[Fully active, able to carry on all pre-disease performance without restriction] : Status 0 - Fully active, able to carry on all pre-disease performance without restriction [Normal] : affect appropriate [de-identified] : III/VI murmur, t/o anterior chest, max at RUSB [de-identified] : edema of the entire LLE, brawny edema [de-identified] : 5 x 3 cm LN mass in left groin, mildly tender, no skin changes

## 2019-07-01 NOTE — ASSESSMENT
[Palliative Care Plan] : not applicable at this time [FreeTextEntry1] : Ms. Walton was seen in the office today, accompanied by her daughter. Comprehensive history was obtained, prior records were reviewed, and the physical examination was performed. Her history is complicated. She was found to have high-grade urothelial cancer which was predominantly small cell a neuroendocrine tumor in late 2013. She was evaluated at [facility]. There was no evidence of muscle invasion, but she clinically had at a minimum T2 disease. She received chemotherapy with carboplatin and etoposide for 4 cycles and underwent a partial cystectomy. There was no residual tumor in the bladder specimen and all margins were negative. She also had a right hemicolectomy at the same time for a stage I cecal adenocarcinoma.\par \par The original biopsy at [facility] was dated December 19, 2013. This revealed invasive high-grade neuroendocrine carcinoma, comprising approximately 98% in the specimen with scant areas suggestive of high-grade urothelial carcinoma. Lamina propria was involved. Muscularis propria was not present in the superficial biopsy. In the tumor cells suggestive of urothelial carcinoma, CK 20 was positive, with weak positivity for CK 7, and P 63. A deeper biopsy also showed tumor invading the lamina propria muscle was present but not involved by tumor.\par \par She's been followed in recent times for a vulvar lesion which is squamous cell carcinoma in situ. She developed some swelling in the lower left extremity and an increase into the upper leg. She saw her vascular surgeon at some point a CT scan was performed, revealing the presence of left inguinal adenopathy. A sonogram guided biopsy was then performed, revealing metastatic carcinoma. Stains were positive for P63, P40 and CK7.  Tumor was nehgative for  MYESHA-3.\par \par She has no pains at this time. She continues to have left lower cavity edema. Her appetite is normal and is no weight loss. There are no major respiratory complaints. She is not taking any medications for her COPD. She has a speech impediment as a result of the stroke in the past.\par \par The etiology of her metastatic tumor is unclear at this time. The pathologist favors the possibility of bladder cancer. I asked our genitourinary pathologist to review the biopsy, I have also asked that it be stained for PD-L1.\par \par She is most distressed by the edema, which is seemingly worse. The lymph node feels larger at this time than on the images from April 26. She also has retroperitoneal and other pelvic adenopathy as well.\par \par I am awaiting the results of the test review as well as the PD-L1 staining. We discussed immunotherapy for metastatic bladder cancer at length. If her tumor is PD-L1 negative, then chemotherapy would be the preferred option.\par \par All questions were answered to the best of my ability and to their apparent satisfaction.

## 2019-07-01 NOTE — RESULTS/DATA
[FreeTextEntry1] : Final Diagnosis\par 1. Bladder, floor, biopsy\par      -Benign urothelial tissue with mild chronic inflammation.\par 2. Bladder, posterior wall, biopsy\par      -Benign urothelial tissue with mild chronic inflammation.\par 3. Bladder, right lateral wall, biopsy\par      -Benign urothelial tissue with mild chronic inflammation.\par      -Muscularis propria (detrusor muscle) is present.\par 4. Bladder, left lateral wall, biopsy\par      -Benign urothelial tissue with moderate chronic inflammation.\par      -Muscularis propria (detrusor muscle) is present.\par Kalli Hanson M.D., Chief of Genitourinary Pathology\par (Electronic Signature)\par Reported on: 06/25/15\par *******************************************\par Final Diagnosis\par 1-2. 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\par     -See comment\par 2-Vulva margins:\par     -Negative for dysplasia\par 3-Right introitus biopsy:\par     -Negative for dysplasia\par Comment: The microinvasion is seen in 2 foci with less than 1mm invasion in the stroma.\par Surgical margin are negative for invasion ( 3 mm from the deep and the vaginal margin).\par Surgical margin designated as opposite to the vaginal margin is positive for squamous cell carcinoma in situ.\par Nisha Christy MD\par (Electronic Signature)\par Reported on: 01/22/17\par ***************************************\par Final Diagnosis\par LYMPH NODE, LEFT INGUINAL, CORE BIOPSY AND FNA\par POSITIVE FOR MALIGNANT CELLS.  \par Metastatic carcinoma.\par Cytology slides, cell block and core biopsies reveal a hypercellular specimen composed of disorganized sheets, groups and abundant single-lying malignant epithelial cells displaying anisonucleosis, coarse chromatin, and inconspicuous nucleoli in a background of necrotic debris.\par Pending immunostains.\par  Screened by: Mary Ali CT(ASCP)\par Verified by: Antonio Zelaya M.D.\par (Electronic Signature)\par Reported on: 06/03/19 14:33 EDT, [address] [place] [address]\par Cytology technical processing performed at [address] [place] [id number]\par ***************************************************\par Addendum\par Immunostains results are as follows:\par P63, P40, CK7: positive\par CK20, MYESHA-3: negative.\par In summary:\par Patient has history of bladder carcinoma and vulva/introitus squamous carcinoma.\par The immunostains results (as expressed), can be seen in both of these neoplasms.\par Although bladder primary is favored , however  a definitive histogenesis cannot be rendered.\par Clinical correlation is mandatory.\par Verified by: Antonio Zelaya M.D.\par (Electronic Signature)\par Reported on: 06/05/19 09:20 EDT, [address] [place] [address]\par **************************************************\par EXAM: CT ANGIO CHEST (W)AW IC    2018\par PROCEDURE DATE: 02/08/2018 \par INTERPRETATION: CT angiogram of the chest. Patient is desaturating. Patient is 2 days status post spinal surgery. \par Patient received 80 cc of Omnipaque 350 IV by power injector. 20 cc was discarded. \par Pulmonary embolism protocol was utilized. Axial MIP imaging was included with the images. \par Sternotomy is noted. There is a slight sternal dehiscence which is likely chronic. \par Heart size is within normal limits. There are coronary calcifications. There is a prosthetic aortic valve. The ascending aorta is 3 cm in diameter in the normal range. \par There is no emboli out to the subsegmental level. \par There are some prominent central mediastinal lymph nodes. Retroaortic lymph node measures 14 mm in the short axis. Lymph node inside the left pulmonary \par artery measures 14 mm in the short axis. Mild subcarinal adenopathy is difficult to delineate from adjacent structures and there is bilateral \par symmetric borderline hilar adenopathy. These nodes could well be reactive. \par There is an atelectatic area at the right base posteriorly with associated mild pleural reaction. Similar findings are seen at the left base. \par There is slight atelectasis in the inferior lingula. \par Slight mucoid material is seen in the trachea. \par There is a mild anterior compression of T11 likely old. \par Operative clips are seen around the lower esophagus. \par IMPRESSION: No evidence of emboli. There are bilateral atelectatic infiltrates in the lower lobes with adjacent mild effusions and some \par lingular area atelectasis as well. \par There are prominent central mediastinal and hilar lymph nodes which could well be reactive. \par There is some mucoid material in the trachea. \par Clips in the lower esophageal area. Mild compression T11 likely old. \par Sternotomy and prosthetic aortic valve. Incidental findings as above. \par JAYSON ACOSTA M.D., ATTENDING RADIOLOGIST \par This document has been electronically signed. Feb 8 2018 3:06PM \par *********************************************************\par A CT scan of the abdomen and pelvis was done with without contrast on April 26, 2019 at an outside radiology center. The films were loaded onto our system. This was compared to the most recent CT scan of March 20, 2018. There was some pleural-based densities posterior and laterally in the right lower lobe. These are stable compared to the prior CT scan. There was a new finding of similar-appearing densities within the visualized right middle lobe. The liver was normal. There was no hydronephrosis. There was developing retroperitoneal enlarged lymph nodes including para-aortic lymphadenopathy and aortocaval lymphadenopathy. No sizes were given in the report. It was also developing retrocrural adenopathy. Left inguinal lymph nodes were seen in conglomeration. The largest measuring 3.1 x 2.5 cm. Left external iliac lymphadenopathy was also present. A compression deformity of T2-11 was unchanged\par ***************************************************\par Addendum\par Additional immunostains results are as follows:\par Synaptophysin, chromogranin, CD56: negative\par Ki67: 80%\par In summary:\par Negative immunostains fore endocrine markers.\par PD-L1 Immunohistochemistry \par Antibody: [place] PDL1 ()\par Tissue type: Lymph node  \par Block: 1B\par Result: Tumor Proportion Score (TPS*) 0% in the neoplastic cells.\par Interpretation: negative\par *TPS: The percentage of viable tumor cells showing partial or complete membrane staining at any intensity \par Verified by: Antonio Zelaya M.D.\par (Electronic Signature)\par Reported on: 06/14/19 10:34 EDT, [address] [place], [place] [id number]

## 2019-07-01 NOTE — REASON FOR VISIT
[Initial Consultation] : an initial consultation [Family Member] : family member [FreeTextEntry2] : recurrent malignancy

## 2019-07-01 NOTE — HISTORY OF PRESENT ILLNESS
[Disease: _____________________] : Disease: [unfilled] [T: ___] : T[unfilled] [N: ___] : N[unfilled] [M: ___] : M[unfilled] [AJCC Stage: ____] : AJCC Stage: [unfilled] [de-identified] : Ms. Walton is seen in the office today in consultation. She was accompanied by her daughter. She was diagnosed with bladder cancer in late 2013. She does not recall having gross hematuria or major urinary symptoms at that time. Record review from [facility] included multiple pathology reports. A cystoscopy was performed in January of 2014 by Dr. 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 She apparently had repeat resection, as other notes indicate that she had muscle invasive disease. In the note from December 1, 2014 from Dr. 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 She received 4 cycles of etoposide and cisplatin and underwent partial cystectomy and pelvic lymph node dissection along with a right salpingo-oophorectomy and right hemicolectomy. The bladder pathology revealed no evidence of residual disease. Cecal pathology was stage I. She is extended intubation after surgery for respiratory failure and underlying COPD.\par \par CT scan in January 2014 revealed left hilar adenopathy along with enhancement of the anterior superior bladder wall. There was fullness in the cecum at that time. A bone scan showed no evidence of disease. A cystoscopy was performed on January 27, 2014 revealing a 3-4 cm mass invading the abdomen anterior wall of the bladder. The note says it clinically had muscle invasion. On July 23, 2014 she underwent partial cystectomy. 2 lymph nodes removed and both were benign.\par \par In initial consultation from Onesimo Wick on February 11, 2014 is available. His note indicates it is a clinical T2 small cell carcinoma bladder. Imaging from prior to her treatment revealed right tracheal adenopathy measuring 1.7 x 1 cm in the left hilar lymph node measuring 1.3 x 1.27 L. His note indicates that there was no clear evidence of muscle invasion, but clinically it was muscle invasive. She received carboplatin at an AUC of 5 and etoposide. She received her treatment at [facility].\par \par In recent times, she's been followed by Dr. 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 She recently developed edema of the left lower extremity. He was initially distal and she went to see her vascular surgeon. She was told to wear support hose. The swelling and increased up to the upper leg. At some point a CT scan was performed revealing the presence of left inguinal adenopathy. A biopsy was then performed, and the results are embedded below.\par \par The lymph node biopsy was performed in early June 2019. This revealed positivity for malignant cells indicating metastatic carcinoma. Immunohistochemical stains revealed positivity for P 63, P 40, and CK 7. Negative studies included CK 20, MYESHA- 3.\par \par She reports no pains. She continues to have left lower extremity edema. Her appetite is good, and there is no weight loss. There were no major respiratory complaints. No blood in the urine. [de-identified] : Addendum Additional immunostains results are as follows: Synaptophysin, chromogranin, CD56: negative Ki67: 80% In summary: Negative immunostains fore endocrine markers. PD-L1 Immunohistochemistry  Antibody: [place] PDL1 () Tissue type: Lymph node   Block: 1B Result: Tumor Proportion Score (TPS*) 0% in the neoplastic cells.

## 2019-07-01 NOTE — REVIEW OF SYSTEMS
[Lower Ext Edema] : lower extremity edema [Muscle Pain] : muscle pain [Easy Bruising] : a tendency for easy bruising [Negative] : Gastrointestinal [Fever] : no fever [Chills] : no chills [Night Sweats] : no night sweats [Recent Change In Weight] : ~T no recent weight change [Chest Pain] : no chest pain [Palpitations] : no palpitations [Wheezing] : no wheezing [Cough] : no cough [SOB on Exertion] : no shortness of breath during exertion [Dysuria] : no dysuria [Incontinence] : no incontinence [Joint Pain] : no joint pain [Joint Stiffness] : no joint stiffness [Skin Rash] : no skin rash [Dizziness] : no dizziness [Anxiety] : no anxiety [Depression] : no depression [Muscle Weakness] : no muscle weakness [Easy Bleeding] : no tendency for easy bleeding [FreeTextEntry9] : vascular issues, ongoing, no cramps [de-identified] : No HA, no paresthesias

## 2019-11-13 ENCOUNTER — APPOINTMENT (OUTPATIENT)
Dept: GYNECOLOGIC ONCOLOGY | Facility: CLINIC | Age: 79
End: 2019-11-13

## 2020-01-14 ENCOUNTER — APPOINTMENT (OUTPATIENT)
Dept: GYNECOLOGIC ONCOLOGY | Facility: CLINIC | Age: 80
End: 2020-01-14
Payer: MEDICARE

## 2020-01-14 VITALS
BODY MASS INDEX: 26.03 KG/M2 | WEIGHT: 124 LBS | DIASTOLIC BLOOD PRESSURE: 61 MMHG | HEIGHT: 58 IN | HEART RATE: 73 BPM | SYSTOLIC BLOOD PRESSURE: 147 MMHG

## 2020-01-14 DIAGNOSIS — C18.0 MALIGNANT NEOPLASM OF CECUM: ICD-10-CM

## 2020-01-14 DIAGNOSIS — C67.9 MALIGNANT NEOPLASM OF BLADDER, UNSPECIFIED: ICD-10-CM

## 2020-01-14 DIAGNOSIS — R59.0 LOCALIZED ENLARGED LYMPH NODES: ICD-10-CM

## 2020-01-14 DIAGNOSIS — R91.8 OTHER NONSPECIFIC ABNORMAL FINDING OF LUNG FIELD: ICD-10-CM

## 2020-01-14 DIAGNOSIS — C51.9 MALIGNANT NEOPLASM OF VULVA, UNSPECIFIED: ICD-10-CM

## 2020-01-14 PROCEDURE — 99215 OFFICE O/P EST HI 40 MIN: CPT

## 2020-01-16 PROBLEM — R59.0 INGUINAL LYMPHADENOPATHY: Status: ACTIVE | Noted: 2019-05-14

## 2020-01-16 PROBLEM — C51.9 VULVAR MALIGNANT NEOPLASM: Status: ACTIVE | Noted: 2017-08-01

## 2020-01-16 NOTE — PHYSICAL EXAM
[Abnormal] : Palpation of lymph nodes in groin: Abnormal [Normal] : Bimanual Exam: Normal [de-identified] : trace edema right, 1+ left, earing compression stocking [de-identified] : fixed adenopathy bilaterally [de-identified] : radiation changes in left groin [Restricted in physically strenuous activity but ambulatory and able to carry out work of a light or sedentary nature] : Status 1- Restricted in physically strenuous activity but ambulatory and able to carry out work of a light or sedentary nature, e.g., light house work, office work

## 2020-09-17 NOTE — BRIEF OPERATIVE NOTE - PRE-OP DX
Spinal stenosis of lumbar region with neurogenic claudication  02/06/2018    Active  Kevin Little
[id number] Comprehensive

## 2023-09-06 NOTE — H&P PST ADULT - CIGARETTES, NUMBER OF YRS
50 Cyclosporine Counseling:  I discussed with the patient the risks of cyclosporine including but not limited to hypertension, gingival hyperplasia,myelosuppression, immunosuppression, liver damage, kidney damage, neurotoxicity, lymphoma, and serious infections. The patient understands that monitoring is required including baseline blood pressure, CBC, CMP, lipid panel and uric acid, and then 1-2 times monthly CMP and blood pressure.

## 2023-10-09 NOTE — INPATIENT CERTIFICATION FOR MEDICARE PATIENTS - IN ORDER TO MEET MEDICARE REQUIREMENTS.
In order to meet Medicare requirements, the clinical documentation must support the information cited in the admission order.  Please be sure to provide detailed and clear documentation about the following in the admitting note/history and physical:
BRITTANY Hernandez, RENETTA Pino, RENETTA Nicholson